# Patient Record
Sex: FEMALE | Race: WHITE | URBAN - METROPOLITAN AREA
[De-identification: names, ages, dates, MRNs, and addresses within clinical notes are randomized per-mention and may not be internally consistent; named-entity substitution may affect disease eponyms.]

---

## 2024-10-22 ENCOUNTER — TELEPHONE (OUTPATIENT)
Dept: OBGYN CLINIC | Age: 29
End: 2024-10-22

## 2024-10-22 NOTE — TELEPHONE ENCOUNTER
Mother called for pt to schedule an appt    Pt was unable to verbally consent to scheduling. The last communication form was filled in 2014 when pt was still a minor. Advise pt to fill out new HIPAA communication form.     The mother stated she would like to schedule her daughter w/ Dr. Calzada per her own research. Looked at Dr. Calzada's availability to give mom and idea of where we are scheduling right now. While looking for NP appt the mother than added that the pt is also pregnant. Advised mom to have daughter call to schedule as scheduling an OB NP is slightly different that than scheduling a NP annual.

## 2024-11-05 ENCOUNTER — OFFICE VISIT (OUTPATIENT)
Dept: OBGYN CLINIC | Age: 29
End: 2024-11-05
Payer: COMMERCIAL

## 2024-11-05 VITALS
WEIGHT: 161 LBS | BODY MASS INDEX: 28.53 KG/M2 | HEIGHT: 63 IN | SYSTOLIC BLOOD PRESSURE: 120 MMHG | DIASTOLIC BLOOD PRESSURE: 72 MMHG

## 2024-11-05 DIAGNOSIS — N92.6 MISSED MENSES: Primary | ICD-10-CM

## 2024-11-05 LAB
CONTROL: ABNORMAL
PREGNANCY TEST URINE, POC: POSITIVE

## 2024-11-05 PROCEDURE — 81025 URINE PREGNANCY TEST: CPT | Performed by: NURSE PRACTITIONER

## 2024-11-05 PROCEDURE — 99203 OFFICE O/P NEW LOW 30 MIN: CPT | Performed by: NURSE PRACTITIONER

## 2024-11-05 RX ORDER — PYRIDOXINE HCL (VITAMIN B6) 50 MG
50 TABLET ORAL 2 TIMES DAILY
Qty: 60 TABLET | Refills: 3 | Status: SHIPPED | OUTPATIENT
Start: 2024-11-05 | End: 2024-12-05

## 2024-11-05 NOTE — PROGRESS NOTES
Carmen Brown  2024    YOB: 1995          The patient was seen today. She is here regarding missed menses. LMP 2024.  Pregnancy not planned but excited. FOB is involved. Denies family hx of congenital heart defects or neural tube defects.  FHT's with doppler 160s.  Her bowels are regular and she is voiding without difficulty.     HPI:  Carmen Brown is a 29 y.o. female  '    Missed menses      OB History    Para Term  AB Living   1 0 0 0 0 0   SAB IAB Ectopic Molar Multiple Live Births   0 0 0 0 0 0      # Outcome Date GA Lbr Brody/2nd Weight Sex Type Anes PTL Lv   1 Current                History reviewed. No pertinent past medical history.    Past Surgical History:   Procedure Laterality Date    WISDOM TOOTH EXTRACTION         Family History   Problem Relation Age of Onset    High Blood Pressure Mother     High Cholesterol Mother     Asthma Sister     Asthma Brother     Heart Disease Maternal Grandmother     COPD Maternal Grandmother     High Blood Pressure Maternal Grandfather     High Cholesterol Maternal Grandfather     Dementia Paternal Grandmother     Other Maternal Great Grandmother         twins       Social History     Socioeconomic History    Marital status: Single     Spouse name: Not on file    Number of children: Not on file    Years of education: Not on file    Highest education level: Not on file   Occupational History    Not on file   Tobacco Use    Smoking status: Never    Smokeless tobacco: Not on file   Vaping Use    Vaping status: Some Days    Substances: Nicotine    Devices: Disposable   Substance and Sexual Activity    Alcohol use: Not on file    Drug use: Not Currently     Types: Marijuana (Weed)    Sexual activity: Not on file   Other Topics Concern    Not on file   Social History Narrative    Not on file     Social Determinants of Health     Financial Resource Strain: Not on file   Food Insecurity: Not on file   Transportation Needs: Not on file

## 2024-11-11 ENCOUNTER — HOSPITAL ENCOUNTER (OUTPATIENT)
Age: 29
Setting detail: SPECIMEN
Discharge: HOME | End: 2024-11-11

## 2024-11-11 ENCOUNTER — INITIAL PRENATAL (OUTPATIENT)
Dept: OBGYN CLINIC | Age: 29
End: 2024-11-11
Payer: COMMERCIAL

## 2024-11-11 VITALS
SYSTOLIC BLOOD PRESSURE: 125 MMHG | HEART RATE: 82 BPM | DIASTOLIC BLOOD PRESSURE: 63 MMHG | BODY MASS INDEX: 28.7 KG/M2 | WEIGHT: 162 LBS

## 2024-11-11 DIAGNOSIS — O09.92 SUPERVISION OF HIGH RISK PREGNANCY IN SECOND TRIMESTER: Primary | ICD-10-CM

## 2024-11-11 DIAGNOSIS — Z3A.15 15 WEEKS GESTATION OF PREGNANCY: Primary | ICD-10-CM

## 2024-11-11 DIAGNOSIS — O09.32 LATE PRENATAL CARE AFFECTING PREGNANCY IN SECOND TRIMESTER: ICD-10-CM

## 2024-11-11 PROCEDURE — 36415 COLL VENOUS BLD VENIPUNCTURE: CPT | Performed by: NURSE PRACTITIONER

## 2024-11-11 NOTE — PROGRESS NOTES
Patient presents to office for OB intake, nurse visit only. Intake completed following ultrasound in office to confirm pregnancy dating/viability. Based on ultrasound, patient is currently 15w2d  gestation, Estimated Date of Delivery: 5/3/25. Patient presents to intake FOB. This was an unplanned pregnancy. Patient currently taking has a current medication list which includes the following prescription(s): prenatal vit-fe fumarate-fa.. Patient's medical, surgical, obstetrical and family history reviewed. See HER for details. Patient prenatal lab work given to patient at this visit as well as OB education material. New OB consent forms signed. Patient declined first trimester screening (patient is too far along).  Cystic Fibrosis screening ordered . Referral placed to [facility] for anatomy scan. Patient scheduled for follow up OB appointment with Tasha. Patient instructed to complete lab work prior to follow up OB appointment.

## 2024-11-11 NOTE — PROGRESS NOTES
Patient was in the office today for a IPV labs.    Draw per physician order using sterile technique.  Drawn from the right ac.

## 2024-11-12 DIAGNOSIS — Z3A.15 15 WEEKS GESTATION OF PREGNANCY: ICD-10-CM

## 2024-11-12 LAB
ABO + RH BLD: NORMAL
BASOPHILS # BLD: 0.03 K/UL (ref 0–0.2)
BASOPHILS NFR BLD: 0 % (ref 0–2)
BILIRUB UR QL STRIP: NEGATIVE
BLOOD GROUP ANTIBODIES SERPL: NEGATIVE
CLARITY UR: CLEAR
COLOR UR: YELLOW
COMMENT: NORMAL
EOSINOPHIL # BLD: 0.16 K/UL (ref 0–0.44)
EOSINOPHILS RELATIVE PERCENT: 2 % (ref 1–4)
ERYTHROCYTE [DISTWIDTH] IN BLOOD BY AUTOMATED COUNT: 13.5 % (ref 11.8–14.4)
GLUCOSE SERPL-MCNC: 85 MG/DL (ref 74–99)
GLUCOSE UR STRIP-MCNC: NEGATIVE MG/DL
HBV SURFACE AG SERPL QL IA: NONREACTIVE
HCT VFR BLD AUTO: 39.2 % (ref 36.3–47.1)
HCV AB SERPL QL IA: NONREACTIVE
HGB BLD-MCNC: 12.4 G/DL (ref 11.9–15.1)
HGB UR QL STRIP.AUTO: NEGATIVE
HIV 1+2 AB+HIV1 P24 AG SERPL QL IA: NONREACTIVE
IMM GRANULOCYTES # BLD AUTO: 0.11 K/UL (ref 0–0.3)
IMM GRANULOCYTES NFR BLD: 1 %
KETONES UR STRIP-MCNC: NEGATIVE MG/DL
LEUKOCYTE ESTERASE UR QL STRIP: NEGATIVE
LYMPHOCYTES NFR BLD: 2.35 K/UL (ref 1.1–3.7)
LYMPHOCYTES RELATIVE PERCENT: 21 % (ref 24–43)
MCH RBC QN AUTO: 29.2 PG (ref 25.2–33.5)
MCHC RBC AUTO-ENTMCNC: 31.6 G/DL (ref 28.4–34.8)
MCV RBC AUTO: 92.2 FL (ref 82.6–102.9)
MONOCYTES NFR BLD: 0.73 K/UL (ref 0.1–1.2)
MONOCYTES NFR BLD: 7 % (ref 3–12)
NEUTROPHILS NFR BLD: 69 % (ref 36–65)
NEUTS SEG NFR BLD: 7.61 K/UL (ref 1.5–8.1)
NITRITE UR QL STRIP: NEGATIVE
NRBC BLD-RTO: 0 PER 100 WBC
PH UR STRIP: 6.5 [PH] (ref 5–8)
PLATELET # BLD AUTO: 263 K/UL (ref 138–453)
PMV BLD AUTO: 12 FL (ref 8.1–13.5)
PROT UR STRIP-MCNC: NEGATIVE MG/DL
RBC # BLD AUTO: 4.25 M/UL (ref 3.95–5.11)
RUBV IGG SERPL QL IA: 320 IU/ML
SP GR UR STRIP: 1 (ref 1–1.03)
T PALLIDUM AB SER QL IA: NONREACTIVE
TSH SERPL DL<=0.05 MIU/L-ACNC: 1.9 UIU/ML (ref 0.27–4.2)
UROBILINOGEN UR STRIP-ACNC: NORMAL EU/DL (ref 0–1)
WBC OTHER # BLD: 11 K/UL (ref 3.5–11.3)

## 2024-11-20 ENCOUNTER — ROUTINE PRENATAL (OUTPATIENT)
Dept: OBGYN CLINIC | Age: 29
End: 2024-11-20

## 2024-11-20 ENCOUNTER — HOSPITAL ENCOUNTER (OUTPATIENT)
Age: 29
Setting detail: SPECIMEN
Discharge: HOME | End: 2024-11-20

## 2024-11-20 VITALS
DIASTOLIC BLOOD PRESSURE: 54 MMHG | SYSTOLIC BLOOD PRESSURE: 104 MMHG | BODY MASS INDEX: 29.05 KG/M2 | WEIGHT: 164 LBS | HEART RATE: 80 BPM

## 2024-11-20 DIAGNOSIS — Z3A.16 16 WEEKS GESTATION OF PREGNANCY: ICD-10-CM

## 2024-11-20 DIAGNOSIS — O09.32 LATE PRENATAL CARE AFFECTING PREGNANCY IN SECOND TRIMESTER: ICD-10-CM

## 2024-11-20 DIAGNOSIS — O09.32 LATE PRENATAL CARE AFFECTING PREGNANCY IN SECOND TRIMESTER: Primary | ICD-10-CM

## 2024-11-20 DIAGNOSIS — Z34.02 PRIMIGRAVIDA IN SECOND TRIMESTER: ICD-10-CM

## 2024-11-20 PROCEDURE — 0502F SUBSEQUENT PRENATAL CARE: CPT | Performed by: NURSE PRACTITIONER

## 2024-11-20 NOTE — PROGRESS NOTES
Patient was in the office today for a Maternal quad, Cystic fibrosis.    Draw per physician order using sterile technique.  Drawn from the Left antecubital.

## 2024-11-20 NOTE — PROGRESS NOTES
Carmen Brown  2024    YOB: 1995    2024   Patient's last menstrual period was 2024 (approximate).  16w4d        Primary Care Physician: No primary care provider on file.        CC: Initial Prenatal Visit    Subjective:     Carmen Brown is a 29 y.o. female     is being seen today for her first obstetrical visit.  This is not a planned pregnancy. She is at 16w4d  Her obstetrical history is significant for LPC, primigravida .      Relationship with FOB: significant other, not living together. Patient does intend to breast feed. Pregnancy history fully reviewed.  Mother's ethnicity:  an    Father's ethnicity:       Objective:   Blood pressure (!) 104/54, pulse 80, weight 74.4 kg (164 lb), last menstrual period 2024.    OB History    Para Term  AB Living   1 0 0 0 0 0   SAB IAB Ectopic Molar Multiple Live Births   0 0 0 0 0 0      # Outcome Date GA Lbr Brody/2nd Weight Sex Type Anes PTL Lv   1 Current                Past Medical History:   Diagnosis Date    Acne 2014    Acute pharyngitis 2013    Otitis media 2013     Past Surgical History:   Procedure Laterality Date    WISDOM TOOTH EXTRACTION        Social History     Socioeconomic History    Marital status: Single     Spouse name: Not on file    Number of children: Not on file    Years of education: Not on file    Highest education level: Not on file   Occupational History    Not on file   Tobacco Use    Smoking status: Never    Smokeless tobacco: Not on file   Vaping Use    Vaping status: Some Days    Substances: Nicotine    Devices: Disposable   Substance and Sexual Activity    Alcohol use: Not on file    Drug use: Not Currently     Types: Marijuana (Weed)    Sexual activity: Not on file   Other Topics Concern    Not on file   Social History Narrative    Not on file     Social Determinants of Health     Financial Resource Strain: Not on file   Food

## 2024-11-21 LAB
C TRACH DNA SPEC QL PROBE+SIG AMP: NEGATIVE
N GONORRHOEA DNA SPEC QL PROBE+SIG AMP: NEGATIVE
SPECIMEN DESCRIPTION: NORMAL

## 2024-11-22 LAB
AFP INTERPRETATION: NORMAL
AFP MOM: 0.54
AFP QUAD INTERPRETATION: NORMAL
AFP SPECIMEN: NORMAL
AFP: 19 NG/ML
DATE OF BIRTH: NORMAL
DATING METHOD: NORMAL
DETERMINED BY: NORMAL
DIABETIC: NORMAL
DIMERIC INHIBIN A: 95 PG/ML
DONOR EGG?: NORMAL
DUE DATE: NORMAL
ESTIMATED DUE DATE: NORMAL
FAMILY HISTORY NTD: NORMAL
GESTATIONAL AGE: NORMAL
HX OF HEREDITARY DISORDERS: NO
IN VITRO FERTILIZATION: NORMAL
INHIBIN A MOM: 0.58
INSULIN REQ DIABETES: NORMAL
LAST MENSTRUAL PERIOD: NORMAL
MATERNAL AGE AT EDD: 27.5 YR
MATERNAL WEIGHT: NORMAL
MOM FOR HCG, 2ND TRIMESTER: 0.54
MONOCHORIONIC TWINS: NORMAL
NUMBER OF FETUSES: NORMAL
PATIENT WEIGHT UNITS: NORMAL
PATIENT WEIGHT: NORMAL
PATIENT'S HCG, TRI 2: NORMAL IU/L
PREV TRISOMY PREG: NORMAL
RACE (MATERNAL): NORMAL
RACE: NORMAL
REPEAT SPECIMEN?: NORMAL
SMOKING: NORMAL
SMOKING: NORMAL
UE3 MOM: 0.76
UE3 VALUE: 1.19 NG/ML
VALPROIC/CARBAMAZEP: NORMAL
ZZ NTE CLEAN UP: HISTORY: NORMAL

## 2024-11-24 LAB
MICROORGANISM SPEC CULT: ABNORMAL
SERVICE CMNT-IMP: ABNORMAL
SPECIMEN DESCRIPTION: ABNORMAL

## 2024-11-25 ENCOUNTER — TELEPHONE (OUTPATIENT)
Dept: OBGYN CLINIC | Age: 29
End: 2024-11-25

## 2024-11-25 NOTE — TELEPHONE ENCOUNTER
[facility] Result Note Patient Contact Communication   [address] [place] [address]      11/25/24   4:06 PM     Patients Name: Carmen Brown   Medical Record Number: [id number]   Contact Serial Number: [id number]  YOB: 1995       Patients Phone Number: [phone]     Description of Recommendations:  The patient was contacted and her identity was confirmed with two of the specific identifiers listed above.  The information regarding her recent testing results and provider recommendations were reviewed with her in detail and all questions were answered.   Recent labs/Cultures/ Imaging Studies/Pathology reports and Urinalysis results are included:      Recommendations given by provider:  ----- Message from ABHISHEK Cleary CNP sent at 11/25/2024  8:14 AM EST -----  + candida - OTC monistat if patient is symptomatic       The patient verbalized understanding of the information above and the recommendation by the provider. She will contact her PCP if any other questions arise or contact our office for any other clarifications.        Electronically signed by Olya Paulino on 11/25/2024 at 4:06 PM

## 2024-11-25 NOTE — TELEPHONE ENCOUNTER
----- Message from ABHISHEK Cleary - CNP sent at 11/25/2024  8:14 AM EST -----  + candida - OTC monistat if patient is symptomatic

## 2024-11-26 LAB
CFTR ALLELE 1 BLD/T QL: NEGATIVE
CFTR ALLELE 2 BLD/T QL: NEGATIVE
CFTR MUT ANL BLD/T: NORMAL
CFTR MUT ANL BLD/T: NORMAL

## 2024-12-01 LAB — CYTOLOGY REPORT: NORMAL

## 2024-12-18 ENCOUNTER — ROUTINE PRENATAL (OUTPATIENT)
Dept: OBGYN CLINIC | Age: 29
End: 2024-12-18

## 2024-12-18 ENCOUNTER — ROUTINE PRENATAL (OUTPATIENT)
Dept: PERINATAL CARE | Age: 29
End: 2024-12-18
Payer: COMMERCIAL

## 2024-12-18 VITALS
WEIGHT: 182 LBS | DIASTOLIC BLOOD PRESSURE: 62 MMHG | RESPIRATION RATE: 16 BRPM | HEIGHT: 63 IN | SYSTOLIC BLOOD PRESSURE: 120 MMHG | TEMPERATURE: 98.8 F | BODY MASS INDEX: 32.25 KG/M2 | HEART RATE: 56 BPM

## 2024-12-18 VITALS
DIASTOLIC BLOOD PRESSURE: 60 MMHG | BODY MASS INDEX: 32.42 KG/M2 | HEART RATE: 90 BPM | WEIGHT: 183 LBS | SYSTOLIC BLOOD PRESSURE: 132 MMHG

## 2024-12-18 DIAGNOSIS — Z3A.20 20 WEEKS GESTATION OF PREGNANCY: ICD-10-CM

## 2024-12-18 DIAGNOSIS — O44.40 LOW IMPLANTATION OF PLACENTA WITHOUT HEMORRHAGE: Primary | ICD-10-CM

## 2024-12-18 DIAGNOSIS — O44.40 LOW-LYING PLACENTA: ICD-10-CM

## 2024-12-18 DIAGNOSIS — O99.212 OBESITY AFFECTING PREGNANCY IN SECOND TRIMESTER, UNSPECIFIED OBESITY TYPE: ICD-10-CM

## 2024-12-18 DIAGNOSIS — Z36.86 ENCOUNTER FOR SCREENING FOR RISK OF PRE-TERM LABOR: ICD-10-CM

## 2024-12-18 DIAGNOSIS — O09.32 LATE PRENATAL CARE AFFECTING PREGNANCY IN SECOND TRIMESTER: Primary | ICD-10-CM

## 2024-12-18 DIAGNOSIS — Z34.02 PRIMIGRAVIDA IN SECOND TRIMESTER: ICD-10-CM

## 2024-12-18 DIAGNOSIS — O09.32 INSUFFICIENT PRENATAL CARE IN SECOND TRIMESTER: ICD-10-CM

## 2024-12-18 PROCEDURE — 76817 TRANSVAGINAL US OBSTETRIC: CPT | Performed by: OBSTETRICS & GYNECOLOGY

## 2024-12-18 PROCEDURE — 76811 OB US DETAILED SNGL FETUS: CPT | Performed by: OBSTETRICS & GYNECOLOGY

## 2024-12-18 PROCEDURE — 99999 PR OFFICE/OUTPT VISIT,PROCEDURE ONLY: CPT | Performed by: OBSTETRICS & GYNECOLOGY

## 2024-12-18 PROCEDURE — 0502F SUBSEQUENT PRENATAL CARE: CPT | Performed by: NURSE PRACTITIONER

## 2024-12-18 NOTE — PROGRESS NOTES
I would advise initiation of daily oral baby aspirin 81 mg based on guidelines by the USPSTF/ACOG (for preeclampsia prevention for pregnant women at \"high-risk\"  for preeclampsia).        Patient has declined the Five Gene Carrier Screen (with the [place] test from [employer]) today.     Please refer to Maternal-Fetal Medicine OBGYN resident progress note in EPIC.

## 2024-12-18 NOTE — PROGRESS NOTES
Obstetric/Gynecology Maternal Fetal Medicine Resident Note    Patient has declined maternal genetic carrier screening.       Jimena Clements DO  OBGYN Resident, PGY2  [facility]  12/18/2024, 10:21 AM

## 2024-12-18 NOTE — PROGRESS NOTES
Carmen Brown is a 29 y.o. female 20w4d        OB History    Para Term  AB Living   1             SAB IAB Ectopic Molar Multiple Live Births                    # Outcome Date GA Lbr Brody/2nd Weight Sex Type Anes PTL Lv   1 Current                Vitals  BP: 132/60  Weight - Scale: 83 kg (183 lb)  Pulse: 90  Patient Position: Sitting  Albumin: Negative  Glucose: Negative    The patient was seen and evaluated. There was positive fetal movements. No contractions or leakage of fluid. Signs and symptoms of  labor as well as labor were reviewed.The Nuchal Translucency testing was reviewed and found to be not completed. A maternal quad screen was reviewed and found to be normal. The patients anatomy ultrasound has been completed and reviewed with patient. TOP ST [place] Reviewed. A 28 week lab panel was ordered. This includes a (HH, 1 hr GTT, U/A C&S). The patient is to complete this in the next two to four weeks.    The following was discussed:  A. Counseling on the incidents of birth defects in the general population being 2-4% and that ultrasound does not diagnose every form of congenital abnormality and has limitations .   B. The only way to evaluate the chromosomal makeup to near 100% certainty is with invasive testing such as chorionic villous sampling or amniocentesis.   C. The options for testing listed in (B) with detail and all risks/benefits and alternatives will be discussed in the high risk setting.   D. NIPT testing options will be discussed with the patient, taking a maternal blood sample and screening it for fetal cells then performing a genetic karyotype.  If this were to be positive for                    a trisomy then a confirmatory amniocentesis or [facility] for confirmation would be needed.    E. TOPSTOH guidelines will be reviewed with the patient.      The S/S of Pre-Eclampsia were reviewed with the patient in detail. She is to report any of these if they occur. She currently

## 2025-01-16 ENCOUNTER — ROUTINE PRENATAL (OUTPATIENT)
Dept: OBGYN CLINIC | Age: 30
End: 2025-01-16

## 2025-01-16 VITALS
SYSTOLIC BLOOD PRESSURE: 118 MMHG | DIASTOLIC BLOOD PRESSURE: 70 MMHG | WEIGHT: 193 LBS | HEART RATE: 86 BPM | BODY MASS INDEX: 34.19 KG/M2

## 2025-01-16 DIAGNOSIS — Z3A.24 24 WEEKS GESTATION OF PREGNANCY: ICD-10-CM

## 2025-01-16 DIAGNOSIS — O44.40 LOW-LYING PLACENTA: Primary | ICD-10-CM

## 2025-01-16 PROCEDURE — 0502F SUBSEQUENT PRENATAL CARE: CPT | Performed by: OBSTETRICS & GYNECOLOGY

## 2025-01-16 NOTE — PROGRESS NOTES
optional Fragile X).  If this were to be positive for carrier status the specimen                   would be analyzed to determine a precise fetal risk.  A Maternal Fetal Medicine referral would then be placed.  The aneuploidy screen will check for (Trisomies 13, 18, and 21), (Sex Chromosome Aneuploidies Monosomy X, XXY, XYY, XXX), (Zygosity in twin pregnancies), (22q11.2                   microdeletion's-optional) and (fetal sex-optional). The patient elected to proceed with this testing and get her blood drawn No   E. TOPSTOH guidelines will be reviewed with the patient.      The S/S of Pre-Eclampsia were reviewed with the patient in detail. She is to report any of these if they occur. She currently denies any of these.    The patient is RH positive Rhogam Ordered no    The patient was instructed on fetal kick counts and was given a kick sheet to complete every 8 hours. This is to begin at 28 weeks gestation. She was instructed that the baby should move at a minimum of ten times within one hour after a meal. The patient was instructed to lay down on her left side twenty minutes after eating and count movements for up to one hour with a target value of ten movements.  She was instructed to notify the office if she did not make that target after two attempts or if after any attempt there was less than four movements.      [facility] to [facility] from [facility]    PILAR by TVUS:     High Risk Dx:      PRENATAL LAB RESULTS:   Pre-pregnancy: BMI  Blood Type/Rh: A+  Antibody Screen: neg  Hemoglobin, Hematocrit: 12.4/39.2  Rubella: immune  T. Pallidum, IgG: neg  Hepatitis B Surface Antigen: neg  Hep c: neg  TSH: 1.90  HIV: neg  Sickle Cell Screen: n/a  Gonorrhea: neg  Chlamydia:neg  Urine culture: neg    Early  hour Glucose Tolerance Test:     28 wk 1 hr GTT    Glucose Fasting: **  1 hour Glucose Tolerance Test: **  2 hour Glucose Tolerance Test: **  3 hour Glucose Tolerance Test: **    Group B Strep:

## 2025-02-12 ENCOUNTER — HOSPITAL ENCOUNTER (OUTPATIENT)
Age: 30
Setting detail: SPECIMEN
Discharge: HOME | End: 2025-02-12

## 2025-02-12 ENCOUNTER — ROUTINE PRENATAL (OUTPATIENT)
Dept: OBGYN CLINIC | Age: 30
End: 2025-02-12
Payer: COMMERCIAL

## 2025-02-12 VITALS
SYSTOLIC BLOOD PRESSURE: 110 MMHG | BODY MASS INDEX: 35.25 KG/M2 | DIASTOLIC BLOOD PRESSURE: 50 MMHG | HEART RATE: 90 BPM | WEIGHT: 199 LBS

## 2025-02-12 DIAGNOSIS — Z23 NEED FOR TDAP VACCINATION: ICD-10-CM

## 2025-02-12 DIAGNOSIS — O44.40 LOW-LYING PLACENTA: ICD-10-CM

## 2025-02-12 DIAGNOSIS — Z3A.28 28 WEEKS GESTATION OF PREGNANCY: ICD-10-CM

## 2025-02-12 DIAGNOSIS — Z34.02 PRIMIGRAVIDA IN SECOND TRIMESTER: ICD-10-CM

## 2025-02-12 DIAGNOSIS — O09.32 LATE PRENATAL CARE AFFECTING PREGNANCY IN SECOND TRIMESTER: Primary | ICD-10-CM

## 2025-02-12 DIAGNOSIS — O09.32 LATE PRENATAL CARE AFFECTING PREGNANCY IN SECOND TRIMESTER: ICD-10-CM

## 2025-02-12 LAB
BACTERIA URNS QL MICRO: ABNORMAL
BASOPHILS # BLD: 0.04 K/UL (ref 0–0.2)
BASOPHILS NFR BLD: 0 % (ref 0–2)
BILIRUB UR QL STRIP: NEGATIVE
CASTS #/AREA URNS LPF: ABNORMAL /LPF (ref 0–8)
CLARITY UR: CLEAR
COLOR UR: YELLOW
EOSINOPHIL # BLD: 0.2 K/UL (ref 0–0.44)
EOSINOPHILS RELATIVE PERCENT: 2 % (ref 1–4)
EPI CELLS #/AREA URNS HPF: ABNORMAL /HPF (ref 0–5)
ERYTHROCYTE [DISTWIDTH] IN BLOOD BY AUTOMATED COUNT: 12.6 % (ref 11.8–14.4)
GLUCOSE 3H P 100 G GLC PO SERPL-MCNC: 114 MG/DL
GLUCOSE UR STRIP-MCNC: NEGATIVE MG/DL
HCT VFR BLD AUTO: 36.3 % (ref 36.3–47.1)
HGB BLD-MCNC: 11.2 G/DL (ref 11.9–15.1)
HGB UR QL STRIP.AUTO: NEGATIVE
IMM GRANULOCYTES # BLD AUTO: 0.21 K/UL (ref 0–0.3)
IMM GRANULOCYTES NFR BLD: 2 %
KETONES UR STRIP-MCNC: NEGATIVE MG/DL
LEUKOCYTE ESTERASE UR QL STRIP: ABNORMAL
LYMPHOCYTES NFR BLD: 2.01 K/UL (ref 1.1–3.7)
LYMPHOCYTES RELATIVE PERCENT: 16 % (ref 24–43)
MCH RBC QN AUTO: 28.1 PG (ref 25.2–33.5)
MCHC RBC AUTO-ENTMCNC: 30.9 G/DL (ref 28.4–34.8)
MCV RBC AUTO: 91.2 FL (ref 82.6–102.9)
MONOCYTES NFR BLD: 0.66 K/UL (ref 0.1–1.2)
MONOCYTES NFR BLD: 5 % (ref 3–12)
NEUTROPHILS NFR BLD: 75 % (ref 36–65)
NEUTS SEG NFR BLD: 9.18 K/UL (ref 1.5–8.1)
NITRITE UR QL STRIP: NEGATIVE
NRBC BLD-RTO: 0 PER 100 WBC
PH UR STRIP: 7.5 [PH] (ref 5–8)
PLATELET # BLD AUTO: 218 K/UL (ref 138–453)
PMV BLD AUTO: 12.3 FL (ref 8.1–13.5)
PROT UR STRIP-MCNC: NEGATIVE MG/DL
RBC # BLD AUTO: 3.98 M/UL (ref 3.95–5.11)
RBC #/AREA URNS HPF: ABNORMAL /HPF (ref 0–4)
SP GR UR STRIP: 1.01 (ref 1–1.03)
UROBILINOGEN UR STRIP-ACNC: NORMAL EU/DL (ref 0–1)
WBC #/AREA URNS HPF: ABNORMAL /HPF (ref 0–5)
WBC OTHER # BLD: 12.3 K/UL (ref 3.5–11.3)

## 2025-02-12 PROCEDURE — 90715 TDAP VACCINE 7 YRS/> IM: CPT | Performed by: NURSE PRACTITIONER

## 2025-02-12 PROCEDURE — 90471 IMMUNIZATION ADMIN: CPT | Performed by: NURSE PRACTITIONER

## 2025-02-12 PROCEDURE — 0502F SUBSEQUENT PRENATAL CARE: CPT | Performed by: NURSE PRACTITIONER

## 2025-02-12 PROCEDURE — 36415 COLL VENOUS BLD VENIPUNCTURE: CPT | Performed by: NURSE PRACTITIONER

## 2025-02-12 NOTE — PROGRESS NOTES
Patient was in the office today for a 1 hour gtt and cbc.    Draw per physician order using sterile technique.  Drawn from the right AC.

## 2025-02-12 NOTE — PROGRESS NOTES
.samir Morales ELIZABETH Brown is a 29 y.o. female 28w4d        OB History    Para Term  AB Living   1             SAB IAB Ectopic Molar Multiple Live Births                    # Outcome Date GA Lbr Brody/2nd Weight Sex Type Anes PTL Lv   1 Current                Vitals  BP: (!) 110/50  Weight - Scale: 90.3 kg (199 lb)  Pulse: 90  Patient Position: Sitting  Albumin: Negative  Glucose: Negative      The patient was seen and evaluated. There was positive fetal movements. No contractions or leakage of fluid. Signs and symptoms of  labor as well as labor were reviewed. The S/S of Pre-Eclampsia were reviewed with the patient in detail. She is to report any of these if they occur. She currently denies any of these.    The patient had her 28 week labs ordered.  No visits with results within 5 Week(s) from this visit.   Latest known visit with results is:   Hospital Outpatient Visit on 2024   Component Date Value Ref Range Status    AFP (Alpha Fetoprotein) 2024 19  ng/mL Final    AFP Mom 2024 0.54   Final    uE3 Value 2024 1.19  ng/mL Final    uE3 MoM 2024 0.76   Final    Patient's HCG, Tri 2 2024 18,928  IU/L Final    Mom For HCG, 2nd Trimester 2024 0.54   Final    Dimeric Inhibin A 2024 95  pg/mL Final    Inhibin A MoM 2024 0.58   Final    AFP Interp 2024 Screen Neg   Final    Comment: (NOTE)  INTERPRETATION: SCREEN NEGATIVE                               Neural Tube Defects (NTD)   Negative       Down syndrome (DS)          Negative       Trisomy 18 (T18)            Negative                                                               Pre-Test     Post-Test    Cutoff                                      Neural Tube Defects Risks   1:1030       < 1:69601    1:250          Down Syndrome Risks         1:939        1:6450       1:150          Trisomy 18 Risks            1:3660       1:4490       1:100

## 2025-02-13 LAB
MICROORGANISM SPEC CULT: NORMAL
SPECIMEN DESCRIPTION: NORMAL

## 2025-02-25 ENCOUNTER — ROUTINE PRENATAL (OUTPATIENT)
Dept: OBGYN CLINIC | Age: 30
End: 2025-02-25

## 2025-02-25 VITALS
BODY MASS INDEX: 36.85 KG/M2 | DIASTOLIC BLOOD PRESSURE: 80 MMHG | HEART RATE: 99 BPM | WEIGHT: 208 LBS | SYSTOLIC BLOOD PRESSURE: 124 MMHG

## 2025-02-25 DIAGNOSIS — Z34.00 PRIMIGRAVIDA, ANTEPARTUM: ICD-10-CM

## 2025-02-25 DIAGNOSIS — O09.30 LATE PRENATAL CARE AFFECTING PREGNANCY, ANTEPARTUM: ICD-10-CM

## 2025-02-25 DIAGNOSIS — Z3A.30 30 WEEKS GESTATION OF PREGNANCY: ICD-10-CM

## 2025-02-25 DIAGNOSIS — O09.93 HIGH-RISK PREGNANCY IN THIRD TRIMESTER: Primary | ICD-10-CM

## 2025-02-25 DIAGNOSIS — O44.40 LOW-LYING PLACENTA: ICD-10-CM

## 2025-02-25 PROCEDURE — 0502F SUBSEQUENT PRENATAL CARE: CPT | Performed by: OBSTETRICS & GYNECOLOGY

## 2025-02-25 NOTE — PROGRESS NOTES
**  1 hour Glucose Tolerance Test: **  2 hour Glucose Tolerance Test: **  3 hour Glucose Tolerance Test: **    Group B Strep:      Cystic Fibrosis Screen:  neg  First Trimester Screen:  late pnc  Quad Screen:  neg  Anatomy US: 24    Tdap: yes; Date given: 2025  Flu shot:   COVID Shot:      T-Dap Vaccine (27-36 weeks): completed    RSV Vaccine (32-36 weeks): awaiting    The patient was instructed on fetal kick counts and was given a kick sheet to complete every 8 hours. She was instructed that the baby should move at a minimum of ten times within one hour after a meal. The patient was instructed to lay down on her left side twenty minutes after eating and count movements for up to one hour with a target value of ten movements.  She was instructed to notify the office if she did not make that target after two attempts or if after any attempt there was less than four movements.    The patient reports that the targets have been made Yes.     Testing: ( testing time excludes any time spent for the prenatal visit)  Not indicated  The recommendation to proceed to fetal kick counts every 8 hours daily instead of Non stress testing, (as per Nirmala RC, Joyce G, Stefano F, Virginia V, [facility] guidance for Covid-19 testing, American Journal of Obstetrics & Gynecology, 2020)     Assessment:  1Terry Brown is a 29 y.o. female  2.   3. 30w3d    Patient Active Problem List    Diagnosis Date Noted    Low-lying placenta 2024     Priority: High     2024 pelvic rest and no heavy lifting      Primigravida in second trimester 2024        Diagnosis Orders   1. High-risk pregnancy in third trimester        2. Low-lying placenta        3. Primigravida, antepartum        4. Late prenatal care affecting pregnancy, antepartum        5. 30 weeks gestation of pregnancy                  Plan:  The patient will return to the office for her next visit in 2 weeks. See antepartum flow sheet.

## 2025-02-26 ENCOUNTER — ROUTINE PRENATAL (OUTPATIENT)
Dept: PERINATAL CARE | Age: 30
End: 2025-02-26
Payer: COMMERCIAL

## 2025-02-26 VITALS
BODY MASS INDEX: 36.86 KG/M2 | HEIGHT: 63 IN | DIASTOLIC BLOOD PRESSURE: 60 MMHG | RESPIRATION RATE: 16 BRPM | HEART RATE: 87 BPM | SYSTOLIC BLOOD PRESSURE: 119 MMHG | TEMPERATURE: 98.1 F | WEIGHT: 208 LBS | OXYGEN SATURATION: 99 %

## 2025-02-26 DIAGNOSIS — Z34.03 PRIMIGRAVIDA, THIRD TRIMESTER: ICD-10-CM

## 2025-02-26 DIAGNOSIS — Z3A.30 30 WEEKS GESTATION OF PREGNANCY: Primary | ICD-10-CM

## 2025-02-26 PROCEDURE — 76817 TRANSVAGINAL US OBSTETRIC: CPT | Performed by: OBSTETRICS & GYNECOLOGY

## 2025-02-26 PROCEDURE — 76819 FETAL BIOPHYS PROFIL W/O NST: CPT | Performed by: OBSTETRICS & GYNECOLOGY

## 2025-02-26 PROCEDURE — 99999 PR OFFICE/OUTPT VISIT,PROCEDURE ONLY: CPT | Performed by: OBSTETRICS & GYNECOLOGY

## 2025-02-26 PROCEDURE — 76805 OB US >/= 14 WKS SNGL FETUS: CPT | Performed by: OBSTETRICS & GYNECOLOGY

## 2025-02-26 NOTE — PROGRESS NOTES
[facility]  [address]  [place] [address]  Dept: [phone]     2025    RE:  Carmen Brown     : 1995   AGE: 29 y.o.     Dear Dr. Dixon,    Thank you for allowing me to see Carmen Brown.  As I'm sure you will recall, Carmen Brown is a 29 y.o. [id number]'s last menstrual period was 2024 (approximate). Estimated Date of Delivery: 5/3/25 at 30w4d seen in our office today for the following:    REASON FOR VISIT: Growth     Patient Active Problem List    Diagnosis Date Noted    Low-lying placenta 2024    Primigravida in second trimester 2024        PAST HISTORY:  OB History    Para Term  AB Living   1             SAB IAB Ectopic Molar Multiple Live Births                    # Outcome Date GA Lbr Brody/2nd Weight Sex Type Anes PTL Lv   1 Current                   MEDICAL:  Past Medical History:   Diagnosis Date    Acne 2014    Acute pharyngitis 2013    Otitis media 2013        SURGICAL:  Past Surgical History:   Procedure Laterality Date    WISDOM TOOTH EXTRACTION         ALLERGIES:    Allergies   Allergen Reactions    Dog Epithelium Other (See Comments)     Animal dander         MEDICATIONS:    Current Outpatient Medications   Medication Sig Dispense Refill    Prenatal Vit-Fe Fumarate-FA (PRENATAL VITAMIN PO) Take by mouth       No current facility-administered medications for this visit.        Social History     Socioeconomic History    Marital status: Single     Spouse name: None    Number of children: None    Years of education: None    Highest education level: None   Tobacco Use    Smoking status: Never    Smokeless tobacco: Never   Vaping Use    Vaping status: Former    Quit date: 2024    Substances: Nicotine    Devices: Disposable   Substance and Sexual Activity    Alcohol use: Not Currently    Drug use: Not Currently     Types: Marijuana

## 2025-03-11 ENCOUNTER — ROUTINE PRENATAL (OUTPATIENT)
Dept: OBGYN CLINIC | Age: 30
End: 2025-03-11

## 2025-03-11 VITALS
HEART RATE: 86 BPM | BODY MASS INDEX: 36.85 KG/M2 | DIASTOLIC BLOOD PRESSURE: 70 MMHG | WEIGHT: 208 LBS | SYSTOLIC BLOOD PRESSURE: 114 MMHG

## 2025-03-11 DIAGNOSIS — Z3A.32 32 WEEKS GESTATION OF PREGNANCY: Primary | ICD-10-CM

## 2025-03-11 PROCEDURE — 0502F SUBSEQUENT PRENATAL CARE: CPT | Performed by: NURSE PRACTITIONER

## 2025-03-11 NOTE — PROGRESS NOTES
.samir Morales ELIZABETH Brown is a 29 y.o. female 32w3d        OB History    Para Term  AB Living   1        SAB IAB Ectopic Molar Multiple Live Births              # Outcome Date GA Lbr Brody/2nd Weight Sex Type Anes PTL Lv   1 Current                Vitals  BP: 114/70  Weight - Scale: 94.3 kg (208 lb)  Pulse: 86  Patient Position: Sitting  Albumin: Negative  Glucose: Negative      The patient was seen and evaluated. There was positive fetal movements. No contractions or leakage of fluid. Signs and symptoms of  labor as well as labor were reviewed. The S/S of Pre-Eclampsia were reviewed with the patient in detail. She is to report any of these if they occur. She currently denies any of these.    The patient had her 28 week labs completed.  Hospital Outpatient Visit on 2025   Component Date Value Ref Range Status    Glucose, GTT - 1 Hour 2025 114  mg/dL Final    Comment: Refer to the ACOG standards for reference ranges. Doe and Coustan Conversion is the   preferred diagnostic criteria.      WBC 2025 12.3 (H)  3.5 - 11.3 k/uL Final    RBC 2025 3.98  3.95 - 5.11 m/uL Final    Hemoglobin 2025 11.2 (L)  11.9 - 15.1 g/dL Final    Hematocrit 2025 36.3  36.3 - 47.1 % Final    MCV 2025 91.2  82.6 - 102.9 fL Final    MCH 2025 28.1  25.2 - 33.5 pg Final    MCHC 2025 30.9  28.4 - 34.8 g/dL Final    RDW 2025 12.6  11.8 - 14.4 % Final    Platelets 2025 218  138 - 453 k/uL Final    MPV 2025 12.3  8.1 - 13.5 fL Final    NRBC Automated 2025 0.0  0.0 per 100 WBC Final    Neutrophils % 2025 75 (H)  36 - 65 % Final    Lymphocytes % 2025 16 (L)  24 - 43 % Final    Monocytes % 2025 5  3 - 12 % Final    Eosinophils % 2025 2  1 - 4 % Final    Basophils % 2025 0  0 - 2 % Final    Immature Granulocytes % 2025 2 (H)  0 % Final    Neutrophils Absolute 2025 9.18 (H)  1.50 - 8.10 k/uL Final

## 2025-03-24 ENCOUNTER — ROUTINE PRENATAL (OUTPATIENT)
Dept: OBGYN CLINIC | Age: 30
End: 2025-03-24

## 2025-03-24 VITALS
SYSTOLIC BLOOD PRESSURE: 112 MMHG | DIASTOLIC BLOOD PRESSURE: 68 MMHG | HEART RATE: 80 BPM | BODY MASS INDEX: 37.55 KG/M2 | WEIGHT: 212 LBS

## 2025-03-24 DIAGNOSIS — O09.93 HIGH-RISK PREGNANCY IN THIRD TRIMESTER: Primary | ICD-10-CM

## 2025-03-24 DIAGNOSIS — O09.30 LATE PRENATAL CARE AFFECTING PREGNANCY, ANTEPARTUM: ICD-10-CM

## 2025-03-24 DIAGNOSIS — Z3A.34 34 WEEKS GESTATION OF PREGNANCY: ICD-10-CM

## 2025-03-24 DIAGNOSIS — O44.40 LOW-LYING PLACENTA: ICD-10-CM

## 2025-03-24 PROCEDURE — 0502F SUBSEQUENT PRENATAL CARE: CPT | Performed by: OBSTETRICS & GYNECOLOGY

## 2025-03-24 NOTE — PROGRESS NOTES
.samir        Carmen Brown is a 29 y.o. female 34w2d        OB History    Para Term  AB Living   1        SAB IAB Ectopic Molar Multiple Live Births              # Outcome Date GA Lbr Brody/2nd Weight Sex Type Anes PTL Lv   1 Current                Vitals  BP: 112/68  Weight - Scale: 96.2 kg (212 lb)  Pulse: 80  Patient Position: Sitting  Albumin: Negative  Glucose: Negative      The patient was seen and evaluated. There was positive fetal movements. No contractions or leakage of fluid. Signs and symptoms of  labor as well as labor were reviewed. The S/S of Pre-Eclampsia were reviewed with the patient in detail. She is to report any of these if they occur. She currently denies any of these.    The patient had her 28 week labs completed.  No visits with results within 5 Week(s) from this visit.   Latest known visit with results is:   Hospital Outpatient Visit on 2025   Component Date Value Ref Range Status    Glucose, GTT - 1 Hour 2025 114  mg/dL Final    Comment: Refer to the ACOG standards for reference ranges. Doe and Coustan Conversion is the   preferred diagnostic criteria.      WBC 2025 12.3 (H)  3.5 - 11.3 k/uL Final    RBC 2025 3.98  3.95 - 5.11 m/uL Final    Hemoglobin 2025 11.2 (L)  11.9 - 15.1 g/dL Final    Hematocrit 2025 36.3  36.3 - 47.1 % Final    MCV 2025 91.2  82.6 - 102.9 fL Final    MCH 2025 28.1  25.2 - 33.5 pg Final    MCHC 2025 30.9  28.4 - 34.8 g/dL Final    RDW 2025 12.6  11.8 - 14.4 % Final    Platelets 2025 218  138 - 453 k/uL Final    MPV 2025 12.3  8.1 - 13.5 fL Final    NRBC Automated 2025 0.0  0.0 per 100 WBC Final    Neutrophils % 2025 75 (H)  36 - 65 % Final    Lymphocytes % 2025 16 (L)  24 - 43 % Final    Monocytes % 2025 5  3 - 12 % Final    Eosinophils % 2025 2  1 - 4 % Final    Basophils % 2025 0  0 - 2 % Final    Immature Granulocytes %

## 2025-04-07 ENCOUNTER — ROUTINE PRENATAL (OUTPATIENT)
Dept: OBGYN CLINIC | Age: 30
End: 2025-04-07

## 2025-04-07 ENCOUNTER — HOSPITAL ENCOUNTER (OUTPATIENT)
Age: 30
Setting detail: SPECIMEN
Discharge: HOME | End: 2025-04-07

## 2025-04-07 VITALS
BODY MASS INDEX: 38.44 KG/M2 | WEIGHT: 217 LBS | SYSTOLIC BLOOD PRESSURE: 118 MMHG | DIASTOLIC BLOOD PRESSURE: 70 MMHG | HEART RATE: 86 BPM

## 2025-04-07 DIAGNOSIS — O44.40 LOW-LYING PLACENTA: Primary | ICD-10-CM

## 2025-04-07 DIAGNOSIS — Z3A.36 36 WEEKS GESTATION OF PREGNANCY: ICD-10-CM

## 2025-04-07 DIAGNOSIS — Z34.02 PRIMIGRAVIDA IN SECOND TRIMESTER: ICD-10-CM

## 2025-04-07 PROCEDURE — 0502F SUBSEQUENT PRENATAL CARE: CPT | Performed by: NURSE PRACTITIONER

## 2025-04-07 NOTE — PROGRESS NOTES
Carmen Brown is a 29 y.o. female 36w2d        OB History    Para Term  AB Living   1        SAB IAB Ectopic Molar Multiple Live Births              # Outcome Date GA Lbr Brody/2nd Weight Sex Type Anes PTL Lv   1 Current                  Vitals  BP: 118/70  Weight - Scale: 98.4 kg (217 lb)  Pulse: 86  Patient Position: Sitting  Albumin: Negative  Glucose: Negative      The patient was seen and evaluated. There was positive fetal movements. No contractions or leakage of fluid. Signs and symptoms of labor were reviewed.  The S/S of Pre-Eclampsia were reviewed with the patient in detail. She is to report any of these if they occur. She currently denies any of these.    The patient was instructed on fetal kick counts and was given a kick sheet to complete every 8 hours. She was instructed that the baby should move at a minimum of ten times within one hour after a meal. The patient was instructed to lay down on her left side twenty minutes after eating and count movements for up to one hour with a target value of ten movements.  She was instructed to notify the office if she did not make that target after two attempts or if after any attempt there was less than four movements.    The patient reports that the targets have been made Yes.    Transfer [facility] to [facility] from [facility]    PILAR by TVUS: 5/3/25    High Risk Dx:  late PNC; low lying placenta    PRENATAL LAB RESULTS:   Pre-pregnancy: BMI  Blood Type/Rh: A+  Antibody Screen: neg  Hemoglobin, Hematocrit: 12.4/39.2  Rubella: immune  T. Pallidum, IgG: neg  Hepatitis B Surface Antigen: neg  Hep c: neg  TSH: 1.90  HIV: neg  Sickle Cell Screen: n/a  Gonorrhea: neg  Chlamydia:neg  Urine culture: neg    Early  hour Glucose Tolerance Test:     28 wk 1 hr GTT:  114    Glucose Fasting: **  1 hour Glucose Tolerance Test: **  2 hour Glucose Tolerance Test: **  3 hour Glucose Tolerance Test: **    Group B Strep:      Cystic Fibrosis Screen:

## 2025-04-08 ENCOUNTER — RESULTS FOLLOW-UP (OUTPATIENT)
Dept: OBGYN CLINIC | Age: 30
End: 2025-04-08

## 2025-04-10 LAB
MICROORGANISM SPEC CULT: NORMAL
SERVICE CMNT-IMP: NORMAL
SPECIMEN DESCRIPTION: NORMAL

## 2025-04-14 ENCOUNTER — ROUTINE PRENATAL (OUTPATIENT)
Dept: OBGYN CLINIC | Age: 30
End: 2025-04-14

## 2025-04-14 VITALS
BODY MASS INDEX: 38.26 KG/M2 | DIASTOLIC BLOOD PRESSURE: 78 MMHG | HEART RATE: 77 BPM | SYSTOLIC BLOOD PRESSURE: 122 MMHG | WEIGHT: 216 LBS

## 2025-04-14 DIAGNOSIS — O44.40 LOW-LYING PLACENTA: ICD-10-CM

## 2025-04-14 DIAGNOSIS — O09.30 LATE PRENATAL CARE AFFECTING PREGNANCY, ANTEPARTUM: ICD-10-CM

## 2025-04-14 DIAGNOSIS — O09.93 HIGH-RISK PREGNANCY IN THIRD TRIMESTER: Primary | ICD-10-CM

## 2025-04-14 DIAGNOSIS — Z3A.37 37 WEEKS GESTATION OF PREGNANCY: ICD-10-CM

## 2025-04-14 PROCEDURE — 0502F SUBSEQUENT PRENATAL CARE: CPT | Performed by: OBSTETRICS & GYNECOLOGY

## 2025-04-14 NOTE — PROGRESS NOTES
Carmen Brown is a 29 y.o. female 37w2d        OB History    Para Term  AB Living   1        SAB IAB Ectopic Molar Multiple Live Births              # Outcome Date GA Lbr Brody/2nd Weight Sex Type Anes PTL Lv   1 Current                Vitals  BP: 122/78  Weight - Scale: 98 kg (216 lb)  Pulse: 77  Patient Position: Sitting  Albumin: Negative  Glucose: Negative      The patient was seen and evaluated. There was positive fetal movements. No contractions or leakage of fluid. Signs and symptoms of labor were reviewed.  The S/S of Pre-Eclampsia were reviewed with the patient in detail. She is to report any of these if they occur. She currently denies any of these.    The patient was instructed on fetal kick counts and was given a kick sheet to complete every 8 hours. She was instructed that the baby should move at a minimum of ten times within one hour after a meal. The patient was instructed to lay down on her left side twenty minutes after eating and count movements for up to one hour with a target value of ten movements.  She was instructed to notify the office if she did not make that target after two attempts or if after any attempt there was less than four movements.    The patient reports that the targets have been made Yes.    Transfer [facility] to [facility] from [facility]    PILAR by TVUS: 5/3/25    High Risk Dx:  late PNC; low lying placenta    PRENATAL LAB RESULTS:   Pre-pregnancy: BMI  Blood Type/Rh: A+  Antibody Screen: neg  Hemoglobin, Hematocrit: 12.4/39.2  Rubella: immune  T. Pallidum, IgG: neg  Hepatitis B Surface Antigen: neg  Hep c: neg  TSH: 1.90  HIV: neg  Sickle Cell Screen: n/a  Gonorrhea: neg  Chlamydia:neg  Urine culture: neg    Early  hour Glucose Tolerance Test:     28 wk 1 hr GTT:  114    Glucose Fasting: **  1 hour Glucose Tolerance Test: **  2 hour Glucose Tolerance Test: **  3 hour Glucose Tolerance Test: **    Group B Strep:  NEG    Cystic Fibrosis Screen:

## 2025-04-24 ENCOUNTER — ROUTINE PRENATAL (OUTPATIENT)
Dept: OBGYN CLINIC | Age: 30
End: 2025-04-24

## 2025-04-24 VITALS
SYSTOLIC BLOOD PRESSURE: 120 MMHG | DIASTOLIC BLOOD PRESSURE: 70 MMHG | HEART RATE: 76 BPM | WEIGHT: 226 LBS | BODY MASS INDEX: 40.03 KG/M2

## 2025-04-24 DIAGNOSIS — Z34.00 PRIMIGRAVIDA, ANTEPARTUM: Primary | ICD-10-CM

## 2025-04-24 DIAGNOSIS — O09.30 LATE PRENATAL CARE AFFECTING PREGNANCY, ANTEPARTUM: ICD-10-CM

## 2025-04-24 DIAGNOSIS — O44.40 LOW-LYING PLACENTA: ICD-10-CM

## 2025-04-24 DIAGNOSIS — Z3A.38 38 WEEKS GESTATION OF PREGNANCY: ICD-10-CM

## 2025-04-24 PROCEDURE — 0502F SUBSEQUENT PRENATAL CARE: CPT | Performed by: OBSTETRICS & GYNECOLOGY

## 2025-04-24 NOTE — PROGRESS NOTES
neg  First Trimester Screen:  late pnc  Quad Screen:  neg  Anatomy US: 24    Tdap: yes; Date given: 2025  Flu shot:   COVID Shot:      T-Dap Vaccine Completed (27-36 weeks): Completed     RSV Vaccine (32-36 weeks): awaiting    Allergies:  Allergies as of 2025 - Fully Reviewed 2025   Allergen Reaction Noted    Dog epithelium Other (See Comments) 2013         Group Beta Strep collection was completed. Yes  GBS Results:   Hospital Outpatient Visit on 2025   Component Date Value Ref Range Status    Specimen Description 2025 .VAGINA   Final    Special Requests 2025 Site: Genital   Final    Culture 2025 NEGATIVE FOR GROUP B STREPTOCOCCI   Final   ]        Cervical Exam was:   /-/V/I cm dilated    The literature regarding a questionable link to pitocin augmentation and induction of labor, the assistance of labor contractions and the initiation of contractions to help delivery, have been reviewed with the patient regarding the increased potential of having a  with Attention Deficit Hyperactivity Disorder and or Autism. These two disorders and the ramifications of their impact on a child and the family caring for that child has been reviewed with the patient in detail. She was given the risks, benefits and alternatives of the use of this medication. She has agreed to its use in the delivery of her unborn child if needed at the time of delivery, Yes.      The patient was counseled on the mandatory call ahead policy. She has been instructed to call the office at anytime prior to going into the hospital so the on-call physician may direct her to the appropriate facility for care. Exceptions were reviewed including but not limited to: Decreased fetal movement, vaginal Bleeding or hemorrhage, trauma, readily expectant delivery, or any instance where she feels 911 should be utilized.      The patient was counseled on Labor & Delivery.   Route of delivery and  no

## 2025-04-26 ENCOUNTER — ANESTHESIA (OUTPATIENT)
Dept: LABOR AND DELIVERY | Age: 30
End: 2025-04-26
Payer: COMMERCIAL

## 2025-04-26 ENCOUNTER — HOSPITAL ENCOUNTER (INPATIENT)
Age: 30
LOS: 2 days | Discharge: HOME | End: 2025-04-28
Attending: OBSTETRICS & GYNECOLOGY | Admitting: OBSTETRICS & GYNECOLOGY
Payer: COMMERCIAL

## 2025-04-26 ENCOUNTER — ANESTHESIA EVENT (OUTPATIENT)
Dept: LABOR AND DELIVERY | Age: 30
End: 2025-04-26
Payer: COMMERCIAL

## 2025-04-26 DIAGNOSIS — D64.9 ANEMIA, UNSPECIFIED TYPE: Primary | ICD-10-CM

## 2025-04-26 PROBLEM — Z3A.39 39 WEEKS GESTATION OF PREGNANCY: Status: ACTIVE | Noted: 2025-04-26

## 2025-04-26 LAB
ABO + RH BLD: NORMAL
AMPHET UR QL SCN: NEGATIVE
ARM BAND NUMBER: NORMAL
BARBITURATES UR QL SCN: NEGATIVE
BASOPHILS # BLD: 0.05 K/UL (ref 0–0.2)
BASOPHILS NFR BLD: 0 % (ref 0–2)
BENZODIAZ UR QL: NEGATIVE
BLOOD BANK SAMPLE EXPIRATION: NORMAL
BLOOD GROUP ANTIBODIES SERPL: NEGATIVE
CANNABINOIDS UR QL SCN: NEGATIVE
COCAINE UR QL SCN: NEGATIVE
EOSINOPHIL # BLD: 0.18 K/UL (ref 0–0.44)
EOSINOPHILS RELATIVE PERCENT: 1 % (ref 1–4)
ERYTHROCYTE [DISTWIDTH] IN BLOOD BY AUTOMATED COUNT: 13.9 % (ref 11.8–14.4)
FENTANYL UR QL: NEGATIVE
HCT VFR BLD AUTO: 35.2 % (ref 36.3–47.1)
HGB BLD-MCNC: 10.9 G/DL (ref 11.9–15.1)
IMM GRANULOCYTES # BLD AUTO: 0.26 K/UL (ref 0–0.3)
IMM GRANULOCYTES NFR BLD: 2 %
LYMPHOCYTES NFR BLD: 3.02 K/UL (ref 1.1–3.7)
LYMPHOCYTES RELATIVE PERCENT: 24 % (ref 24–43)
MCH RBC QN AUTO: 25.3 PG (ref 25.2–33.5)
MCHC RBC AUTO-ENTMCNC: 31 G/DL (ref 28.4–34.8)
MCV RBC AUTO: 81.7 FL (ref 82.6–102.9)
METHADONE UR QL: NEGATIVE
MONOCYTES NFR BLD: 0.92 K/UL (ref 0.1–1.2)
MONOCYTES NFR BLD: 7 % (ref 3–12)
NEUTROPHILS NFR BLD: 66 % (ref 36–65)
NEUTS SEG NFR BLD: 8.32 K/UL (ref 1.5–8.1)
NRBC BLD-RTO: 0 PER 100 WBC
OPIATES UR QL SCN: NEGATIVE
OXYCODONE UR QL SCN: NEGATIVE
PCP UR QL SCN: NEGATIVE
PLATELET # BLD AUTO: 234 K/UL (ref 138–453)
PMV BLD AUTO: 12.3 FL (ref 8.1–13.5)
RBC # BLD AUTO: 4.31 M/UL (ref 3.95–5.11)
RBC # BLD: ABNORMAL 10*6/UL
T PALLIDUM AB SER QL IA: NONREACTIVE
TEST INFORMATION: NORMAL
WBC OTHER # BLD: 12.8 K/UL (ref 3.5–11.3)

## 2025-04-26 PROCEDURE — 6370000000 HC RX 637 (ALT 250 FOR IP)

## 2025-04-26 PROCEDURE — 84156 ASSAY OF PROTEIN URINE: CPT

## 2025-04-26 PROCEDURE — 6360000002 HC RX W HCPCS

## 2025-04-26 PROCEDURE — 3700000025 EPIDURAL BLOCK: Performed by: ANESTHESIOLOGY

## 2025-04-26 PROCEDURE — 1220000000 HC SEMI PRIVATE OB R&B

## 2025-04-26 PROCEDURE — 0KQM0ZZ REPAIR PERINEUM MUSCLE, OPEN APPROACH: ICD-10-PCS | Performed by: OBSTETRICS & GYNECOLOGY

## 2025-04-26 PROCEDURE — 86780 TREPONEMA PALLIDUM: CPT

## 2025-04-26 PROCEDURE — 80307 DRUG TEST PRSMV CHEM ANLYZR: CPT

## 2025-04-26 PROCEDURE — 82570 ASSAY OF URINE CREATININE: CPT

## 2025-04-26 PROCEDURE — 7200000001 HC VAGINAL DELIVERY

## 2025-04-26 PROCEDURE — 51701 INSERT BLADDER CATHETER: CPT

## 2025-04-26 PROCEDURE — 86901 BLOOD TYPING SEROLOGIC RH(D): CPT

## 2025-04-26 PROCEDURE — 86850 RBC ANTIBODY SCREEN: CPT

## 2025-04-26 PROCEDURE — 85025 COMPLETE CBC W/AUTO DIFF WBC: CPT

## 2025-04-26 PROCEDURE — 2580000003 HC RX 258

## 2025-04-26 PROCEDURE — 86900 BLOOD TYPING SEROLOGIC ABO: CPT

## 2025-04-26 PROCEDURE — 59400 OBSTETRICAL CARE: CPT | Performed by: OBSTETRICS & GYNECOLOGY

## 2025-04-26 PROCEDURE — 6360000002 HC RX W HCPCS: Performed by: ANESTHESIOLOGY

## 2025-04-26 PROCEDURE — 88307 TISSUE EXAM BY PATHOLOGIST: CPT

## 2025-04-26 RX ORDER — ONDANSETRON 4 MG/1
4 TABLET, ORALLY DISINTEGRATING ORAL EVERY 6 HOURS PRN
Status: DISCONTINUED | OUTPATIENT
Start: 2025-04-26 | End: 2025-04-26

## 2025-04-26 RX ORDER — SODIUM CHLORIDE, SODIUM LACTATE, POTASSIUM CHLORIDE, AND CALCIUM CHLORIDE .6; .31; .03; .02 G/100ML; G/100ML; G/100ML; G/100ML
500 INJECTION, SOLUTION INTRAVENOUS PRN
Status: DISCONTINUED | OUTPATIENT
Start: 2025-04-26 | End: 2025-04-26

## 2025-04-26 RX ORDER — ACETAMINOPHEN 500 MG
1000 TABLET ORAL EVERY 6 HOURS SCHEDULED
Status: DISCONTINUED | OUTPATIENT
Start: 2025-04-26 | End: 2025-04-28 | Stop reason: HOSPADM

## 2025-04-26 RX ORDER — ROPIVACAINE HYDROCHLORIDE 2 MG/ML
INJECTION, SOLUTION EPIDURAL; INFILTRATION; PERINEURAL
Status: COMPLETED
Start: 2025-04-26 | End: 2025-04-26

## 2025-04-26 RX ORDER — EPHEDRINE SULFATE/0.9% NACL/PF 25 MG/5 ML
10 SYRINGE (ML) INTRAVENOUS EVERY 10 MIN PRN
Status: DISCONTINUED | OUTPATIENT
Start: 2025-04-26 | End: 2025-04-26

## 2025-04-26 RX ORDER — AMOXICILLIN 250 MG
1 CAPSULE ORAL DAILY
Qty: 30 TABLET | Refills: 0 | Status: SHIPPED | OUTPATIENT
Start: 2025-04-26

## 2025-04-26 RX ORDER — ACETAMINOPHEN 500 MG
1000 TABLET ORAL 4 TIMES DAILY PRN
Qty: 30 TABLET | Refills: 1 | Status: SHIPPED | OUTPATIENT
Start: 2025-04-26

## 2025-04-26 RX ORDER — SENNA AND DOCUSATE SODIUM 50; 8.6 MG/1; MG/1
1 TABLET, FILM COATED ORAL 2 TIMES DAILY
Status: DISCONTINUED | OUTPATIENT
Start: 2025-04-26 | End: 2025-04-28 | Stop reason: HOSPADM

## 2025-04-26 RX ORDER — DIPHENHYDRAMINE HCL 25 MG
25 TABLET ORAL EVERY 4 HOURS PRN
Status: DISCONTINUED | OUTPATIENT
Start: 2025-04-26 | End: 2025-04-26

## 2025-04-26 RX ORDER — BISACODYL 10 MG
10 SUPPOSITORY, RECTAL RECTAL DAILY PRN
Status: DISCONTINUED | OUTPATIENT
Start: 2025-04-26 | End: 2025-04-28 | Stop reason: HOSPADM

## 2025-04-26 RX ORDER — PROCHLORPERAZINE EDISYLATE 5 MG/ML
10 INJECTION INTRAMUSCULAR; INTRAVENOUS ONCE
Status: COMPLETED | OUTPATIENT
Start: 2025-04-26 | End: 2025-04-26

## 2025-04-26 RX ORDER — LIDOCAINE HYDROCHLORIDE 10 MG/ML
30 INJECTION, SOLUTION EPIDURAL; INFILTRATION; INTRACAUDAL; PERINEURAL PRN
Status: DISCONTINUED | OUTPATIENT
Start: 2025-04-26 | End: 2025-04-26

## 2025-04-26 RX ORDER — LIDOCAINE HYDROCHLORIDE 10 MG/ML
INJECTION, SOLUTION EPIDURAL; INFILTRATION; INTRACAUDAL; PERINEURAL
Status: DISCONTINUED | OUTPATIENT
Start: 2025-04-26 | End: 2025-04-26

## 2025-04-26 RX ORDER — IBUPROFEN 600 MG/1
600 TABLET, FILM COATED ORAL EVERY 6 HOURS
Status: DISCONTINUED | OUTPATIENT
Start: 2025-04-26 | End: 2025-04-28 | Stop reason: HOSPADM

## 2025-04-26 RX ORDER — IBUPROFEN 600 MG/1
600 TABLET, FILM COATED ORAL 4 TIMES DAILY PRN
Qty: 30 TABLET | Refills: 1 | Status: SHIPPED | OUTPATIENT
Start: 2025-04-26

## 2025-04-26 RX ORDER — SODIUM CHLORIDE 0.9 % (FLUSH) 0.9 %
5-40 SYRINGE (ML) INJECTION PRN
Status: DISCONTINUED | OUTPATIENT
Start: 2025-04-26 | End: 2025-04-26

## 2025-04-26 RX ORDER — ONDANSETRON 2 MG/ML
4 INJECTION INTRAMUSCULAR; INTRAVENOUS EVERY 6 HOURS PRN
Status: DISCONTINUED | OUTPATIENT
Start: 2025-04-26 | End: 2025-04-26

## 2025-04-26 RX ORDER — DIPHENHYDRAMINE HYDROCHLORIDE 50 MG/ML
25 INJECTION, SOLUTION INTRAMUSCULAR; INTRAVENOUS EVERY 4 HOURS PRN
Status: DISCONTINUED | OUTPATIENT
Start: 2025-04-26 | End: 2025-04-26

## 2025-04-26 RX ORDER — SIMETHICONE 80 MG
80 TABLET,CHEWABLE ORAL EVERY 6 HOURS PRN
Status: DISCONTINUED | OUTPATIENT
Start: 2025-04-26 | End: 2025-04-26

## 2025-04-26 RX ORDER — SODIUM CHLORIDE 9 MG/ML
INJECTION, SOLUTION INTRAVENOUS PRN
Status: DISCONTINUED | OUTPATIENT
Start: 2025-04-26 | End: 2025-04-28 | Stop reason: HOSPADM

## 2025-04-26 RX ORDER — ONDANSETRON 2 MG/ML
4 INJECTION INTRAMUSCULAR; INTRAVENOUS EVERY 6 HOURS PRN
Status: DISCONTINUED | OUTPATIENT
Start: 2025-04-26 | End: 2025-04-28 | Stop reason: HOSPADM

## 2025-04-26 RX ORDER — SODIUM CHLORIDE, SODIUM LACTATE, POTASSIUM CHLORIDE, CALCIUM CHLORIDE 600; 310; 30; 20 MG/100ML; MG/100ML; MG/100ML; MG/100ML
INJECTION, SOLUTION INTRAVENOUS CONTINUOUS
Status: DISCONTINUED | OUTPATIENT
Start: 2025-04-26 | End: 2025-04-26

## 2025-04-26 RX ORDER — SODIUM CHLORIDE 0.9 % (FLUSH) 0.9 %
5-40 SYRINGE (ML) INJECTION EVERY 12 HOURS SCHEDULED
Status: DISCONTINUED | OUTPATIENT
Start: 2025-04-26 | End: 2025-04-26

## 2025-04-26 RX ORDER — SODIUM CHLORIDE 0.9 % (FLUSH) 0.9 %
5-40 SYRINGE (ML) INJECTION PRN
Status: DISCONTINUED | OUTPATIENT
Start: 2025-04-26 | End: 2025-04-28 | Stop reason: HOSPADM

## 2025-04-26 RX ORDER — SODIUM CHLORIDE 0.9 % (FLUSH) 0.9 %
5-40 SYRINGE (ML) INJECTION EVERY 12 HOURS SCHEDULED
Status: DISCONTINUED | OUTPATIENT
Start: 2025-04-26 | End: 2025-04-28 | Stop reason: HOSPADM

## 2025-04-26 RX ORDER — SIMETHICONE 80 MG
80 TABLET,CHEWABLE ORAL EVERY 6 HOURS PRN
Status: DISCONTINUED | OUTPATIENT
Start: 2025-04-26 | End: 2025-04-28 | Stop reason: HOSPADM

## 2025-04-26 RX ORDER — ONDANSETRON 4 MG/1
4 TABLET, ORALLY DISINTEGRATING ORAL EVERY 6 HOURS PRN
Status: DISCONTINUED | OUTPATIENT
Start: 2025-04-26 | End: 2025-04-28 | Stop reason: HOSPADM

## 2025-04-26 RX ORDER — SODIUM CHLORIDE 9 MG/ML
INJECTION, SOLUTION INTRAVENOUS PRN
Status: DISCONTINUED | OUTPATIENT
Start: 2025-04-26 | End: 2025-04-26

## 2025-04-26 RX ORDER — SENNA AND DOCUSATE SODIUM 50; 8.6 MG/1; MG/1
1 TABLET, FILM COATED ORAL DAILY
Status: DISCONTINUED | OUTPATIENT
Start: 2025-04-26 | End: 2025-04-26

## 2025-04-26 RX ORDER — MORPHINE SULFATE 2 MG/ML
2 INJECTION, SOLUTION INTRAMUSCULAR; INTRAVENOUS ONCE
Status: COMPLETED | OUTPATIENT
Start: 2025-04-26 | End: 2025-04-26

## 2025-04-26 RX ORDER — ACETAMINOPHEN 500 MG
1000 TABLET ORAL EVERY 6 HOURS PRN
Status: DISCONTINUED | OUTPATIENT
Start: 2025-04-26 | End: 2025-04-26

## 2025-04-26 RX ORDER — LIDOCAINE HYDROCHLORIDE AND EPINEPHRINE 15; 5 MG/ML; UG/ML
INJECTION, SOLUTION EPIDURAL
Status: DISCONTINUED | OUTPATIENT
Start: 2025-04-26 | End: 2025-04-26 | Stop reason: SDUPTHER

## 2025-04-26 RX ORDER — MORPHINE SULFATE 10 MG/ML
8 INJECTION INTRAVENOUS ONCE
Status: COMPLETED | OUTPATIENT
Start: 2025-04-26 | End: 2025-04-26

## 2025-04-26 RX ORDER — NALOXONE HYDROCHLORIDE 0.4 MG/ML
INJECTION, SOLUTION INTRAMUSCULAR; INTRAVENOUS; SUBCUTANEOUS PRN
Status: DISCONTINUED | OUTPATIENT
Start: 2025-04-26 | End: 2025-04-26

## 2025-04-26 RX ORDER — LANOLIN
CREAM (ML) TOPICAL PRN
Status: DISCONTINUED | OUTPATIENT
Start: 2025-04-26 | End: 2025-04-28 | Stop reason: HOSPADM

## 2025-04-26 RX ADMIN — Medication 166.7 ML: at 17:35

## 2025-04-26 RX ADMIN — SODIUM CHLORIDE, SODIUM LACTATE, POTASSIUM CHLORIDE, AND CALCIUM CHLORIDE: .6; .31; .03; .02 INJECTION, SOLUTION INTRAVENOUS at 07:14

## 2025-04-26 RX ADMIN — PROCHLORPERAZINE EDISYLATE 10 MG: 5 INJECTION INTRAMUSCULAR; INTRAVENOUS at 07:15

## 2025-04-26 RX ADMIN — SENNOSIDES AND DOCUSATE SODIUM 1 TABLET: 50; 8.6 TABLET ORAL at 21:50

## 2025-04-26 RX ADMIN — LIDOCAINE HYDROCHLORIDE,EPINEPHRINE BITARTRATE 3 ML: 15; .005 INJECTION, SOLUTION EPIDURAL; INFILTRATION; INTRACAUDAL; PERINEURAL at 08:07

## 2025-04-26 RX ADMIN — ROPIVACAINE HYDROCHLORIDE 5 ML: 2 INJECTION, SOLUTION EPIDURAL; INFILTRATION at 08:12

## 2025-04-26 RX ADMIN — MORPHINE SULFATE 2 MG: 2 INJECTION, SOLUTION INTRAMUSCULAR; INTRAVENOUS at 07:13

## 2025-04-26 RX ADMIN — MORPHINE SULFATE 8 MG: 10 INJECTION INTRAVENOUS at 07:15

## 2025-04-26 RX ADMIN — IBUPROFEN 600 MG: 600 TABLET, FILM COATED ORAL at 18:36

## 2025-04-26 RX ADMIN — BENZOCAINE AND LEVOMENTHOL: 200; 5 SPRAY TOPICAL at 21:52

## 2025-04-26 RX ADMIN — ROPIVACAINE HYDROCHLORIDE 12 ML/HR: 2 INJECTION, SOLUTION EPIDURAL; INFILTRATION at 13:48

## 2025-04-26 RX ADMIN — ROPIVACAINE HYDROCHLORIDE 10 ML/HR: 2 INJECTION, SOLUTION EPIDURAL; INFILTRATION at 08:14

## 2025-04-26 ASSESSMENT — PAIN DESCRIPTION - LOCATION: LOCATION: ABDOMEN

## 2025-04-26 ASSESSMENT — PAIN SCALES - GENERAL: PAINLEVEL_OUTOF10: 6

## 2025-04-26 NOTE — FLOWSHEET NOTE
0948,   TRAV Bustos, at bedside.  Epidural procedure explained, risks discussed.  Pt verbalizes consent for epidural.   0950patient positioned for epidural.0950  Time out completed.   0955 catheter placed. 0956 test dose given.  Epidural catheter taped and secured per anesthesia.   1000 to low fowlers with left uterine displacement. 1000 loading dose given. 1001 pump initiated.  Pt tolerated procedure well.

## 2025-04-26 NOTE — CARE COORDINATION
ANTEPARTUM NOTE    39 weeks gestation of pregnancy [Z3A.39]    Carmen was admitted to L&D on 4/26/2024 for spontaneous labor @ 39 0/7    OB GYN Provider: Dr Moon    Will meet with patient after delivery to verify name/address/phone/insurance and discuss discharge planning.     Anticipate DC home 2 nights after vaginal delivery or 4 nights after C/S delivery as long as hemodynamically stable.

## 2025-04-26 NOTE — FLOWSHEET NOTE
0730,   TRAV Gan, at bedside.  Epidural procedure explained, risks discussed.  Pt verbalizes consent for epidural.   0735 patient positioned for epidural.0736 Time out completed.   0806 catheter placed. 0807 test dose given.  Epidural catheter taped and secured per anesthesia.   0811 to low fowlers with left uterine displacement. 0812 loading dose given. 0816 pump initiated.  Pt tolerated procedure well.

## 2025-04-26 NOTE — ANESTHESIA PROCEDURE NOTES
Epidural Block    Patient location during procedure: OB  End time: 4/26/2025 8:07 AM  Reason for block: labor epidural  Staffing  Performed: resident/CRNA   Anesthesiologist: Shavonne Ortiz MD  Resident/CRNA: Elvia Kohli APRN - CRNA  Performed by: Elvia Kohli APRN - CRNA  Authorized by: Shavonne Ortiz MD    Epidural  Patient position: sitting  Prep: Betadine  Patient monitoring: continuous pulse ox and frequent blood pressure checks  Approach: midline  Location: L3-4  Injection technique: TOYIN air  Guidance: Doppler guided  Provider prep: mask and sterile gloves  Needle  Needle type: Tuohy   Needle gauge: 17 G  Needle insertion depth: 9 cm  Catheter type: side hole  Catheter size: 19 G  Catheter at skin depth: 15 cm  Test dose: negativeCatheter Secured: tegaderm and tape  Assessment  Sensory level: T4  Hemodynamics: stable  Attempts: 2  Outcomes: uncomplicated and patient tolerated procedure well  Preanesthetic Checklist  Completed: patient identified, IV checked, site marked, risks and benefits discussed, surgical/procedural consents, equipment checked, pre-op evaluation, timeout performed, anesthesia consent given, oxygen available, monitors applied/VS acknowledged, fire risk safety assessment completed and verbalized and blood product R/B/A discussed and consented

## 2025-04-26 NOTE — DISCHARGE SUMMARY
as: ADVIL;MOTRIN  Take 1 tablet by mouth 4 times daily as needed for Pain     senna-docusate 8.6-50 MG per tablet  Commonly known as: Senokot S  Take 1 tablet by mouth daily            CONTINUE taking these medications      PRENATAL VITAMIN PO               Where to Get Your Medications        These medications were sent to [id number] - [place], [place] - [address] - P [phone] - F [phone]  [address] [facility] [address]      Phone: [phone]   acetaminophen 500 MG tablet  ibuprofen 600 MG tablet  senna-docusate 8.6-50 MG per tablet       These medications were sent to [place], [place] - [address] [facility] -  [phone] - F [phone]  [facility] [facility] [address]      Phone: [phone]   ferrous sulfate 325 (65 Fe) MG tablet           Activity: pelvic rest x 6 weeks, no lifting greater than 15 lbs  Diet: regular diet  Follow up: 3 days for BP check    Condition on discharge: stable    Discharge date: 4/28/25    Nasrin Davis DO  Ob/Gyn Resident    Comments:  Home care and follow-up care were reviewed.  Pelvic rest, and birth control were reviewed. Signs and symptoms of mastitis and post partum depression were reviewed. The patient is to notify her physician if any of these occur. The patient was counseled on secondary smoke risks and the increased risk of sudden infant death syndrome and respiratory problems to her baby with exposure. She was counseled on various alternate recommendations to decrease the exposure to secondary smoke to her children.

## 2025-04-26 NOTE — H&P
OBSTETRICAL HISTORY AND PHYSICAL  [facility]    Date: 2025       Time: 4:54 AM   Patient Name: Carmen Brown     Patient : 1995  Room/Bed: 0705/0705-01    Admission Date/Time: 2025  4:21 AM      CC: Leakage of Fluid     HPI: Carmen Brown is a 29 y.o.  at 39w0d who presents to Labor and Delivery with leakage of fluid. Patient reports she woke up to a large gush of fluid around 0330. She has had trickling of fluid since that time.  Patient denies any fever, chills, N/V, headaches, vision changes, chest pain, shortness of breath, RUQ pain, abdominal pain, and increased swelling/tenderness in bilateral lower extremities. Patient denies any urinary complaints. The patient reports fetal movement is present, complains of contractions, complains of loss of fluid, denies vaginal bleeding.    DATING:  LMP: Patient's last menstrual period was 2024 (approximate).  Estimated Date of Delivery: 5/3/25   Based on: US at 15 2/7 weeks GA    PREGNANCY RISK FACTORS:  Patient Active Problem List   Diagnosis    Primigravida in second trimester    Low-lying placenta    39 weeks gestation of pregnancy        Steroids Given In This Pregnancy:  no     REVIEW OF SYSTEMS:   Constitutional: negative fever, negative chills, negative weight changes   HEENT: negative visual disturbances, negative headaches, negative dizziness, negative hearing loss  Breast: Negative breast abnormalities, negative breast lumps, negative nipple discharge  Respiratory: negative dyspnea, negative cough, negative SOB  Cardiovascular: negative chest pain,  negative palpitations, negative arrhythmia, negative syncope   Gastrointestinal: + abdominal pain (contractions), negative RUQ pain, negative N/V, negative diarrhea, negative constipation, negative bowel changes, negative heartburn   Genitourinary: negative dysuria, negative hematuria, negative urinary incontinence, + vaginal discharge, negative vaginal

## 2025-04-26 NOTE — ANESTHESIA PRE PROCEDURE
ROS              GI/Hepatic/Renal: Neg GI/Hepatic/Renal ROS            Endo/Other: Negative Endo/Other ROS                    Abdominal:             Vascular: negative vascular ROS.         Other Findings:             Anesthesia Plan      epidural     ASA 3     (Plt 234)        Anesthetic plan and risks discussed with patient.    Use of blood products discussed with patient whom consented to blood products.    Plan discussed with CRNA and attending.    Attending anesthesiologist reviewed and agrees with Preprocedure content                ABHISHEK Copeland - CRNA   4/26/2025

## 2025-04-26 NOTE — ANESTHESIA PROCEDURE NOTES
Epidural Block    Patient location during procedure: OB  Start time: 4/26/2025 9:55 AM  Reason for block: labor epidural  Staffing  Performed: resident/CRNA   Resident/CRNA: Castillo Barnes APRN - CRNA  Performed by: Castillo Barnes APRN - CRNA  Authorized by: Shavonne Ortiz MD    Epidural  Patient position: sitting  Prep: Betadine  Patient monitoring: continuous pulse ox and frequent blood pressure checks  Approach: midline  Location: L3-4  Injection technique: TOYIN air  Guidance: paresthesia technique  Provider prep: mask and sterile gloves  Needle  Needle type: Tuohy   Needle gauge: 17 G  Needle length: 3.5 in  Needle insertion depth: 9 cm  Catheter type: side hole  Catheter size: 20 G  Catheter at skin depth: 14 cm  Test dose: negativeCatheter Secured: tegaderm and tape  Assessment  Sensory level: T6  Hemodynamics: stable  Attempts: 1  Outcomes: uncomplicated and patient tolerated procedure well  Preanesthetic Checklist  Completed: patient identified, IV checked, site marked, risks and benefits discussed, surgical/procedural consents, equipment checked, pre-op evaluation, timeout performed, anesthesia consent given, oxygen available, monitors applied/VS acknowledged, fire risk safety assessment completed and verbalized and blood product R/B/A discussed and consented

## 2025-04-27 PROBLEM — Z3A.39 39 WEEKS GESTATION OF PREGNANCY: Status: RESOLVED | Noted: 2025-04-26 | Resolved: 2025-04-27

## 2025-04-27 PROBLEM — Z34.02 PRIMIGRAVIDA IN SECOND TRIMESTER: Status: RESOLVED | Noted: 2024-11-20 | Resolved: 2025-04-27

## 2025-04-27 LAB
ALBUMIN SERPL-MCNC: 2.9 G/DL (ref 3.5–5.2)
ALBUMIN/GLOB SERPL: 1.2 {RATIO} (ref 1–2.5)
ALP SERPL-CCNC: 127 U/L (ref 35–104)
ALT SERPL-CCNC: 18 U/L (ref 10–35)
ANION GAP SERPL CALCULATED.3IONS-SCNC: 9 MMOL/L (ref 9–16)
AST SERPL-CCNC: 27 U/L (ref 10–35)
BILIRUB SERPL-MCNC: 0.3 MG/DL (ref 0–1.2)
BUN SERPL-MCNC: 6 MG/DL (ref 6–20)
CALCIUM SERPL-MCNC: 8.5 MG/DL (ref 8.6–10.4)
CHLORIDE SERPL-SCNC: 109 MMOL/L (ref 98–107)
CO2 SERPL-SCNC: 20 MMOL/L (ref 20–31)
CREAT SERPL-MCNC: 0.5 MG/DL (ref 0.6–0.9)
CREAT UR-MCNC: 22.3 MG/DL (ref 28–217)
ERYTHROCYTE [DISTWIDTH] IN BLOOD BY AUTOMATED COUNT: 13.9 % (ref 11.8–14.4)
GFR, ESTIMATED: >90 ML/MIN/1.73M2
GLUCOSE SERPL-MCNC: 94 MG/DL (ref 74–99)
HCT VFR BLD AUTO: 29.2 % (ref 36.3–47.1)
HGB BLD-MCNC: 8.8 G/DL (ref 11.9–15.1)
MCH RBC QN AUTO: 25.3 PG (ref 25.2–33.5)
MCHC RBC AUTO-ENTMCNC: 30.1 G/DL (ref 28.4–34.8)
MCV RBC AUTO: 83.9 FL (ref 82.6–102.9)
NRBC BLD-RTO: 0 PER 100 WBC
PLATELET # BLD AUTO: 179 K/UL (ref 138–453)
PMV BLD AUTO: 12.4 FL (ref 8.1–13.5)
POTASSIUM SERPL-SCNC: 3.8 MMOL/L (ref 3.7–5.3)
PROT SERPL-MCNC: 5.3 G/DL (ref 6.6–8.7)
RBC # BLD AUTO: 3.48 M/UL (ref 3.95–5.11)
SODIUM SERPL-SCNC: 138 MMOL/L (ref 136–145)
TOTAL PROTEIN, URINE: 29 MG/DL
URINE TOTAL PROTEIN CREATININE RATIO: 1.3 (ref 0–0.2)
WBC OTHER # BLD: 13.7 K/UL (ref 3.5–11.3)

## 2025-04-27 PROCEDURE — 36415 COLL VENOUS BLD VENIPUNCTURE: CPT

## 2025-04-27 PROCEDURE — 85027 COMPLETE CBC AUTOMATED: CPT

## 2025-04-27 PROCEDURE — 6370000000 HC RX 637 (ALT 250 FOR IP)

## 2025-04-27 PROCEDURE — 80053 COMPREHEN METABOLIC PANEL: CPT

## 2025-04-27 PROCEDURE — 99024 POSTOP FOLLOW-UP VISIT: CPT | Performed by: OBSTETRICS & GYNECOLOGY

## 2025-04-27 PROCEDURE — 1220000000 HC SEMI PRIVATE OB R&B

## 2025-04-27 RX ORDER — FERROUS SULFATE 325(65) MG
325 TABLET ORAL DAILY
Qty: 60 TABLET | Refills: 1 | Status: SHIPPED | OUTPATIENT
Start: 2025-04-27

## 2025-04-27 RX ADMIN — IBUPROFEN 600 MG: 600 TABLET, FILM COATED ORAL at 13:33

## 2025-04-27 RX ADMIN — IBUPROFEN 600 MG: 600 TABLET, FILM COATED ORAL at 20:33

## 2025-04-27 RX ADMIN — IBUPROFEN 600 MG: 600 TABLET, FILM COATED ORAL at 03:42

## 2025-04-27 RX ADMIN — ACETAMINOPHEN 1000 MG: 500 TABLET ORAL at 08:30

## 2025-04-27 RX ADMIN — ACETAMINOPHEN 1000 MG: 500 TABLET ORAL at 15:22

## 2025-04-27 RX ADMIN — SENNOSIDES AND DOCUSATE SODIUM 1 TABLET: 50; 8.6 TABLET ORAL at 07:06

## 2025-04-27 RX ADMIN — SENNOSIDES AND DOCUSATE SODIUM 1 TABLET: 50; 8.6 TABLET ORAL at 20:33

## 2025-04-27 ASSESSMENT — PAIN DESCRIPTION - LOCATION
LOCATION: PERINEUM
LOCATION: ABDOMEN
LOCATION: PERINEUM
LOCATION: ABDOMEN

## 2025-04-27 ASSESSMENT — PAIN DESCRIPTION - DESCRIPTORS
DESCRIPTORS: DISCOMFORT
DESCRIPTORS: ACHING
DESCRIPTORS: CRAMPING
DESCRIPTORS: DISCOMFORT

## 2025-04-27 ASSESSMENT — PAIN DESCRIPTION - ORIENTATION
ORIENTATION: LOWER
ORIENTATION: MID;LOWER
ORIENTATION: MID;LOWER

## 2025-04-27 ASSESSMENT — PAIN SCALES - GENERAL
PAINLEVEL_OUTOF10: 2
PAINLEVEL_OUTOF10: 1
PAINLEVEL_OUTOF10: 2
PAINLEVEL_OUTOF10: 0

## 2025-04-27 NOTE — PLAN OF CARE
Problem: Vaginal Birth or  Section  Goal: Fetal and maternal status remain reassuring during the birth process  Description:  Birth OB-Pregnancy care plan goal which identifies if the fetal and maternal status remain reassuring during the birth process  2025 by Betsy Sotelo, RN  Outcome: Progressing  2025 by Giselle Kimball RN  Outcome: Progressing     Problem: Pain  Goal: Verbalizes/displays adequate comfort level or baseline comfort level  2025 by Betsy Sotelo, RN  Outcome: Progressing  2025 by Giselle Kimball RN  Outcome: Progressing

## 2025-04-27 NOTE — CARE COORDINATION
CASE MANAGEMENT POST-PARTUM TRANSITIONAL CARE PLAN    39 weeks gestation of pregnancy [Z3A.39]    OB Provider: Dr Red Leone met darleen/ Carmen at her bedside to discuss DCP. She is S/P  on 2025    [place] to [place]    Writer verified address, her phone number are correct on facesheet. Parent's phone numbers updated under the emergency contacts. She lives with her sister Nelly. Carmen denied barriers with transportation home, to doctor's appointments or with paying for medications upon discharge home.     [employer] insurance correct. Writer notified Carmen she has 30 days from date of birth to add  to insurance policy. She verbalized understanding.    Infant name on BC: Doni Pascualdamien Casiano.   Infant PCP Dr Vega.     DME: no  HOME CARE: no    Anticipated DC of mother and infant 1-2 days after .     BS RISK OF UNPLANNED READMISSION 2.0             1.7 Total Score

## 2025-04-27 NOTE — CONSULTS
INPATIENT CONSULT    Maternal /para status: primip     Current pregnancy:    Gestational age: 39 weeks     C/section or vaginal delivery:       Birth weight: 3645  8# 0.6oz    Plan for feeding: breast      Breast pump at home: has pump ordered      Assessment of breastfeeding:  Pt states baby feeding well at breast, denies pain or difficulty with latch.       Reviewed:   - Breastfeeding packet  - Expectations for normal  feeding   - Hand expression  - Deep latch/milk transfer  - Cues for feeding (early/late)     Encouraged:   - Frequent skin to skin with mom or dad  - Frequent attempts to feed  - Calling for assistance as needed

## 2025-04-28 VITALS
BODY MASS INDEX: 40.04 KG/M2 | TEMPERATURE: 97.7 F | SYSTOLIC BLOOD PRESSURE: 119 MMHG | HEART RATE: 76 BPM | WEIGHT: 226 LBS | DIASTOLIC BLOOD PRESSURE: 74 MMHG | OXYGEN SATURATION: 99 % | HEIGHT: 63 IN | RESPIRATION RATE: 16 BRPM

## 2025-04-28 PROCEDURE — 6370000000 HC RX 637 (ALT 250 FOR IP)

## 2025-04-28 RX ADMIN — ACETAMINOPHEN 1000 MG: 500 TABLET ORAL at 04:12

## 2025-04-28 RX ADMIN — IBUPROFEN 600 MG: 600 TABLET, FILM COATED ORAL at 07:58

## 2025-04-28 RX ADMIN — IBUPROFEN 600 MG: 600 TABLET, FILM COATED ORAL at 15:34

## 2025-04-28 RX ADMIN — SENNOSIDES AND DOCUSATE SODIUM 1 TABLET: 50; 8.6 TABLET ORAL at 07:59

## 2025-04-28 RX ADMIN — ACETAMINOPHEN 1000 MG: 500 TABLET ORAL at 11:52

## 2025-04-28 ASSESSMENT — PAIN SCALES - GENERAL
PAINLEVEL_OUTOF10: 1
PAINLEVEL_OUTOF10: 1
PAINLEVEL_OUTOF10: 2
PAINLEVEL_OUTOF10: 1

## 2025-04-28 ASSESSMENT — PAIN DESCRIPTION - DESCRIPTORS
DESCRIPTORS: CRAMPING
DESCRIPTORS: ACHING

## 2025-04-28 ASSESSMENT — PAIN DESCRIPTION - LOCATION
LOCATION: ABDOMEN;BACK
LOCATION: ABDOMEN

## 2025-04-28 NOTE — CARE COORDINATION
Discharge Report    [facility]  Clinical Case Management Department  Written by: SMILEY NÚÑEZ RN    Patient Name: Carmen Brown  Attending Provider: Shannan Dixon*  Admit Date: 2025  4:21 AM  MRN: [id number]  Account: [id number]                     : 1995  Discharge Date: 25      Disposition: home    SMILEY NÚÑEZ RN

## 2025-04-28 NOTE — ANESTHESIA POSTPROCEDURE EVALUATION
Department of Anesthesiology  Postprocedure Note    Patient: Carmen Brown  MRN: [id number]  YOB: 1995  Date of evaluation: 4/28/2025    Procedure Summary       Date: 04/26/25 Room / Location:     Anesthesia Start: 0741 Anesthesia Stop: 1730    Procedure: Labor Analgesia Diagnosis:     Scheduled Providers:  Responsible Provider: Shavonne Ortiz MD    Anesthesia Type: epidural ASA Status: 3            Anesthesia Type: No value filed.    Denise Phase I:      Denise Phase II:      Anesthesia Post Evaluation    Patient location during evaluation: bedside  Patient participation: complete - patient cannot participate  Level of consciousness: awake  Pain score: 1  Airway patency: patent  Nausea & Vomiting: no nausea and no vomiting  Cardiovascular status: blood pressure returned to baseline  Respiratory status: acceptable  Hydration status: euvolemic  Pain management: adequate        No notable events documented.

## 2025-04-28 NOTE — LACTATION NOTE
Mom reports difficulty getting baby latched on the breast. She reports having given the baby a bottle 20 minutes before. She noted onset of supply beginning. Reviewed comfort measures for engorgement. Mom to call out for LC assistance when baby alerts for a feeding.

## 2025-04-28 NOTE — DISCHARGE INSTRUCTIONS
Follow-up with your OB doctor in 3 days for blood pressure check or as specified by your physician.     Please refer to A NEW BEGINNING- YOUR PERSONAL GUIDE TO POSTPARTUM CARE book provided in your room. Please take this book home with you to refer to.    For Breastfeeding moms, you can contact our lactation specialist,  with any problems or questions you may have.  Phone number [phone]. Feel free to leave voice mail and your call will be returned as soon as possible.     DIET  Eat a well balanced diet focusing on foods high in fiber and protein.   Drink 8-10 glasses of fluids daily, especially water.  To avoid constipation you may take a mild stool softener as recommended by your doctor or midwife.    ACTIVITY  Gradually increase your activity.  Resume exercise regimen only after advise by your doctor or midwife.  Avoid lifting anything heavier than your baby or a gallon of milk for SIX weeks.   Avoid driving 1 week for vaginal delivery and 2 weeks for  section, unless otherwise instructed by physician.  Rise slowly from a lying to sitting and then a standing position.  Climb stairs carefully.  Use caution when carrying your baby up and down the stairs.  NO SEXUAL Activity for 6 weeks or until advised by your doctor; Nothing in vagina: intercourse, tampons, or douching.  Be prepared to discuss family planning at your follow-up OB visit.   You may feel tired or have a lack of energy.  You may continue your prenatal vitamin to replenish nutrients post delivery.  Nap when baby naps to catch up on sleep.   May shower, NO tub baths, swimming, or hot tubs.    EMOTIONS  You may feel alcocer, sad, teary, & overwhelmed for the first 2 weeks postpartum.  Contact your OB provider if you feel you may be showing signs of postpartum depression, or have thoughts of harming yourself or your infant.  If infant will not stop crying, contact another adult for help or place infant in their crib on their back and take a

## 2025-04-28 NOTE — PROGRESS NOTES
Anesthesia Epidural Rounding Progress Note    Carmen Brown   [id number]      Pt evaluated at bedside. See flowsheet for current vital signs. Pt states her pain is well  controlled.    There is no significant motor blockade.     Pt states she has not used the PCA button. The PCA button was within reach of the patient.. The pt was encouraged to use the PCA button as needed to supplement the continuous infusion.    All questions and concerns were addressed.     No adjustments needed to the epidural at this time.    ABHISHEK RUIZ - CRNA  4/26/25   11:47 AM EDT   
CLINICAL PHARMACY NOTE: MEDS TO BEDS    Total # of Prescriptions Filled: 1   The following medications were delivered to the patient:  Iron 325mg    Additional Documentation: delivered to patient in room 735 4/28 at 10:31am. Co-pay $0.33 [email or web address].  
Labor Progress Note    Carmen Brown is a 29 y.o. female  at 39w0d  The patient was seen and examined. Her pain is not well controlled. She has received her epidural but it is not providing her any pain relief. She reports fetal movement is present, complains of contractions, complains of loss of fluid, denies vaginal bleeding.       Vital Signs:  Vitals:    25 0827 25 0831 25 0846 25 0902   BP: 129/73 115/66 110/74 (!) 121/91   Pulse: 80 79 76 (!) 103   Resp: 17 16 16 16   Temp:       TempSrc:       SpO2:  98% 99%    Weight:       Height:             FHT: 130, moderate variability, accelerations present, decelerations absent  Contractions: regular but not tracing well during epidural    Chaperone for Intimate Exam: Chaperone was present for entire exam, Chaperone Name: MEENU Kincaid  Cervical Exam: 4 cm dilated, 90 effaced, -1 station  Pitocin: Not started yet    Membranes: SROM at 0330 clear fluid  Scalp Electrode in place: absent  Intrauterine Pressure Catheter in Place: absent    Interventions: SVE    Assessment/Plan:  Carmen Brown is a 29 y.o. female  at 39w0d admitted for Spontaneous labor with SROM (clear fluid) @ 0330   - GBS negative, No indication for GBS prophylaxis   - VSS, afebrile   -SVE: /-1   -cEFM/TOCO: category 1   -Epidural in place but not functioning well. Anesthesia contacted    -s/p morphine/compazine x1   -Continue to monitor   -Pitocin when contractions space out    Attending updated and in agreement with plan    Jimena Clements DO  Ob/Gyn Resident  2025, 9:16 AM    
Labor Progress Note    Carmen Brown is a 29 y.o. female  at 39w0d  The patient was seen and examined. Her pain is not well controlled. She reports fetal movement is present, complains of contractions, complains of loss of fluid, complains of vaginal bleeding.       Vital Signs:  Vitals:    25 0427 25 0600   BP:  127/73   Pulse:  70   Resp:  16   Temp:  98.4 °F (36.9 °C)   SpO2:  100%   Weight: 102.5 kg (226 lb)    Height: 1.6 m (5' 3\")        FHT:  135 , moderate variability, accelerations present, decelerations absent  Contractions: regular, every 2-4 minutes    Chaperone for Intimate Exam: Chaperone was present for entire exam, Chaperone Name: MEENU Lerner  Cervical Exam: 4 cm dilated, 90% effaced, 0 station  Pitocin: none> ordered    Membranes: Ruptured clear fluid  Scalp Electrode in place: absent  Intrauterine Pressure Catheter in Place: absent    Interventions: SVE    Assessment/Plan:  Carmen Brown is a 29 y.o. female  at 39w0d admitted for spontaneous labor w/ SROM    - GBS negative, No indication for GBS prophylaxis  - VSS, afebrile    - cEFM and TOCO: cat 1 w/ regular contractions   - SROM (clr) @ 0330   - SVE 4/90%/0   - Patient requesting pain medicaiton. Will order morphine/compazine x1   - Initiate pitocin per protocol   - Continue to monitor    Attending updated and in agreement with plan    Chantell Johns MD  Ob/Gyn Resident  2025, 7:09 AM    
Labor Progress Note    Carmen Brown is a 29 y.o. female  at 39w0d  The patient was seen and examined. Her pain is well controlled with an epidural. She reports fetal movement is present, complains of contractions, complains of loss of fluid, denies vaginal bleeding.       Vital Signs:  Vitals:    25 1205 25 1230 25 1300 25 1330   BP:  94/64 (!) 93/57 114/86   Pulse:   72 93   Resp:  16 16 16   Temp: 97.3 °F (36.3 °C)      TempSrc: Oral      SpO2:  100% 100% 100%   Weight:       Height:           FHT:  125 , moderate variability, accelerations present, decelerations absent  Contractions: regular, every 2-3 minutes    Chaperone for Intimate Exam: Chaperone was present for entire exam, Chaperone Name: Sanjiv CORRIGAN  Cervical Exam: 9 cm dilated, 90 effaced, 0 station  Pitocin: @ 0 mu/min    Membranes: Ruptured clear fluid  Scalp Electrode in place: absent  Intrauterine Pressure Catheter in Place: present    Interventions: SVE     Assessment/Plan:  Carmen Brown is a 29 y.o. female  at 39w0d admitted for Spontaneous labor with SROM    - GBS negative, No indication for GBS prophylaxis   - VSS, afebrile    - Cat 1 FHT and TOCO showing regular contractions    - Membranes SROM (clr) @ 0330   - SVE 9/90%/0   - cEFM and TOCO   - IUPC in place    - Epidural in place and functioning  - Continue to monitor closely     Attending updated and in agreement with plan    Annalise Burleson MD  Ob/Gyn Resident  2025, 1:45 PM   
Labor Progress Note    Carmen Brown is a 29 y.o. female  at 39w0d  The patient was seen and examined. Her pain is well controlled. She reports fetal movement is present, complains of contractions, complains of loss of fluid, denies vaginal bleeding.       Vital Signs:  Vitals:    25 1010 25 1021 25 1026 25 1032   BP: 113/63 (!) 118/51 117/61 111/65   Pulse: (!) 103 (!) 106 84 77   Resp: 16 17 16 16   Temp: 97.5 °F (36.4 °C)      TempSrc: Oral      SpO2: 99% 99% 100%    Weight:       Height:           FHT: 125, moderate variability, accelerations present, decelerations absent  Contractions: regular, every 2 minutes    Chaperone for Intimate Exam: Chaperone was present for entire exam, Chaperone Name: MEENU Kincaid  Cervical Exam: 4-5 cm dilated, 90 effaced, 0 station  Pitocin: Not started    Membranes: SROM clear fluid at 0330  Scalp Electrode in place: absent  Intrauterine Pressure Catheter in Place: IUPC placed    Interventions: SVE, IUPC    Assessment/Plan:  Carmen Brown is a 29 y.o. female  at 39w0d admitted for Spontaneous labor with SROM (clear fluid) @ 0330    - GBS negative, Pen G for GBS prophylaxis   - VSS, afebrile   -SVE: /   -cEFM/TOCO: category 1   -Epidural in place functioning well    -IUPC placed given periods of inability to trace contractions   -Pitocin per protocol   -Continue to monitor    Attending updated and in agreement with plan    Jimena Clements DO  Ob/Gyn Resident  2025, 11:15 AM    
Labor Progress Note    Carmen Brown is a 29 y.o. female  at 39w0d  The patient was seen and examined. Her pain is well controlled. She reports fetal movement is present, complains of contractions, complains of loss of fluid, denies vaginal bleeding.       Vital Signs:  Vitals:    25 1403 25 1433 25 1500 25 1510   BP: (!) 115/92 134/62 127/73    Pulse: 91 94 90    Resp: 16 17 18    Temp:    98.2 °F (36.8 °C)   TempSrc:    Oral   SpO2:   100%    Weight:       Height:           FHT: 125, moderate variability, accelerations present, decelerations absent  Contractions: regular, every 2 minutes    Chaperone for Intimate Exam: Chaperone was present for entire exam, Chaperone Name: MEENU Kincaid  Cervical Exam: anterior lip cm dilated, 90 effaced, 0 station  Pitocin: Not started    Membranes: SROM clear fluid at 0330  Scalp Electrode in place: absent  Intrauterine Pressure Catheter in Place: IUPC placed    Interventions: SVE    Assessment/Plan:  Carmen Brown is a 29 y.o. female  at 39w0d admitted for Spontaneous labor with SROM (clear fluid) @ 0330    - GBS negative, Pen G for GBS prophylaxis   - VSS, afebrile   -SVE: Anterior lip/90/0   -cEFM/TOCO: category 1   -Epidural in place functioning well    -IUPC in place   -Continue expectant management    Attending updated and in agreement with plan    Jimena Clements,   Ob/Gyn Resident  2025, 3:17 PM    
Obstetric/Gynecology Resident Interval Note    Repeat SVE performed. Unchanged at 9/90/0. Tracing remains category 1. Will reposition patient and perform SVE if she calls out.     Dr. Moon updated.     Jimena Clements DO  OB/GYN Resident, PGY2  [place], [place]  4/26/2025, 2:11 PM      
Obstetric/Gynecology Resident Interval Note    Tracing reviewed, Baseline 125 bpm, moderate variability, accelerations present, decelerations absent. Contractions q2 minutes with IUPC in place. Pitocin not started due to contraction frequency.     Tracing reassuring. Will perform SVE if patient calls out.     Jimena Clements, DO  OB/GYN Resident, PGY2  [place], [place]  4/26/2025, 1:32 PM      
POST PARTUM DAY # 1    Carmen Brown is a 29 y.o. female  This patient was seen & examined today. She is s/p  25    Her pregnancy was complicated by:   Patient Active Problem List   Diagnosis    Primigravida in second trimester    Low-lying placenta    39 weeks gestation of pregnancy    Normal labor and delivery     25 M Apg 8/9 Wt 8#0       Today she is doing well without any chief complaint. Her lochia is light. She denies chest pain, shortness of breath, headache, and lightheadedness. She is  breast feeding and she denies any breast tenderness. She is ambulating well. Her voiding pattern is normal. I reviewed signs and symptoms of post partum depression with the patient, she currently denies any of these symptoms. She is tolerating solids.     Vital Signs:  Vitals:    25 1830 25 1845 25 1900 25   BP: 133/68 (!) 126/96 (!) 117/47 133/70   Pulse: (!) 106 87 97 81   Resp:  16 17 16   Temp:    99.7 °F (37.6 °C)   TempSrc:    Oral   SpO2:    98%   Weight:       Height:             Physical Exam:  General:  no apparent distress, alert, and cooperative  Neurologic:  alert, oriented, normal speech, no focal findings or movement disorder noted  Lungs:  No increased work of breathing, good air exchange, no conversational dyspnea  Heart:  regular rate and rhythm    Abdomen: abdomen soft, non-distended, non-tender  Fundus: non-tender, normal size, firm, below umbilicus  Extremities:  no calf tenderness, non edematous      Lab:  Lab Results   Component Value Date    HGB 10.9 (L) 2025     Lab Results   Component Value Date    HCT 35.2 (L) 2025       Assessment/Plan:  Carmen Brown is a  PPD # 1 s/p    - Doing well, VSS   - male infant in General Care Nursery, circumcision desired   - Encourage ambulation   - Hgb pending this AM   - Motrin/Tylenol for pain  Rh positive/Rubella immune  -Rhogam not indicated  Breast feeding   -Counseled on s/sx of Mastitis  BMI 
POST PARTUM DAY # 2    Carmen Brown is a 29 y.o. female  This patient was seen & examined today. She is s/p  25    Her pregnancy was complicated by:   Patient Active Problem List   Diagnosis    Low-lying placenta     25 M Apg 8/9 Wt 8#0    PreE w/o SF    Postpartum state       Today she is doing well without any chief complaint. Her lochia is light. She denies chest pain, shortness of breath, headache, and lightheadedness. She is  breast feeding and she denies any breast tenderness. She is ambulating well. Her voiding pattern is normal. I reviewed signs and symptoms of post partum depression with the patient, she currently denies any of these symptoms. She is tolerating solids.     Vital Signs:  Vitals:    25 0828 25 1522 25 0411   BP: 113/67 (!) 129/55 119/72 118/71   Pulse: 75 82 92 79   Resp: 18 18 16 17   Temp: 98.1 °F (36.7 °C) 98.1 °F (36.7 °C) 97.9 °F (36.6 °C) 97.9 °F (36.6 °C)   TempSrc: Oral Oral Oral Oral   SpO2: 99%  99% 99%   Weight:       Height:             Physical Exam:  General:  no apparent distress, alert, and cooperative  Neurologic:  alert, oriented, normal speech, no focal findings or movement disorder noted  Lungs:  No increased work of breathing, good air exchange, no conversational dyspnea  Heart:  regular rate and rhythm    Abdomen: abdomen soft, non-distended, non-tender  Fundus: non-tender, normal size, firm, below umbilicus  Extremities:  no calf tenderness, non edematous      Lab:  Lab Results   Component Value Date    HGB 8.8 (L) 2025     Lab Results   Component Value Date    HCT 29.2 (L) 2025       Assessment/Plan:  Carmen Brown is a  PPD # 2 s/p    - Doing well, VSS   - Male infant in General Care Nursery, circumcision completed, awaiting reevaluation of circumcision prior to discharge of  due to moderate amount of bleeding following procedure    - Encourage ambulation   - Hgb PPD#1 8.8. Rx iron sent for 
Patient admitted to room 735 from L/D via wheelchair.   Oriented to room and surroundings.  Plan of care reviewed.  Verbalized understanding.  Instructed on infant security and safe sleep practices.   The following handouts given: A New Beginning: Your Guide to Postpartum Care, Rounding, [facility] Security System, [place] Meals, Babies Cry A lot, Safe Sleep, Security and Visitation Guidelines.   Call light placed within reach    
Final    MCV 04/26/2025 81.7 (L)  82.6 - 102.9 fL Final    MCH 04/26/2025 25.3  25.2 - 33.5 pg Final    MCHC 04/26/2025 31.0  28.4 - 34.8 g/dL Final    RDW 04/26/2025 13.9  11.8 - 14.4 % Final    Platelets 04/26/2025 234  138 - 453 k/uL Final    MPV 04/26/2025 12.3  8.1 - 13.5 fL Final    NRBC Automated 04/26/2025 0.0  0.0 per 100 WBC Final    Neutrophils % 04/26/2025 66 (H)  36 - 65 % Final    Lymphocytes % 04/26/2025 24  24 - 43 % Final    Monocytes % 04/26/2025 7  3 - 12 % Final    Eosinophils % 04/26/2025 1  1 - 4 % Final    Basophils % 04/26/2025 0  0 - 2 % Final    Immature Granulocytes % 04/26/2025 2 (H)  0 % Final    Neutrophils Absolute 04/26/2025 8.32 (H)  1.50 - 8.10 k/uL Final    Lymphocytes Absolute 04/26/2025 3.02  1.10 - 3.70 k/uL Final    Monocytes Absolute 04/26/2025 0.92  0.10 - 1.20 k/uL Final    Eosinophils Absolute 04/26/2025 0.18  0.00 - 0.44 k/uL Final    Basophils Absolute 04/26/2025 0.05  0.00 - 0.20 k/uL Final    Immature Granulocytes Absolute 04/26/2025 0.26  0.00 - 0.30 k/uL Final    RBC Morphology 04/26/2025 MICROCYTOSIS PRESENT   Final    Amphetamine Screen, Ur 04/26/2025 NEGATIVE  NEGATIVE Final    Cutoff: 1000 ng/mL    Barbiturate Screen, Ur 04/26/2025 NEGATIVE  NEGATIVE Final    Cutoff: 200 ng/ml    Benzodiazepine Screen, Urine 04/26/2025 NEGATIVE  NEGATIVE Final    Cutoff: 200 ng/ml    Cocaine Metabolite, Urine 04/26/2025 NEGATIVE  NEGATIVE Final    Cutoff: 300 ng/ml    Methadone Screen, Urine 04/26/2025 NEGATIVE  NEGATIVE Final    Cutoff: 300 ng/ml    Opiates, Urine 04/26/2025 NEGATIVE  NEGATIVE Final    Cutoff: 300 ng/ml    Phencyclidine, Urine 04/26/2025 NEGATIVE  NEGATIVE Final    Cutoff: 25 ng/ml    Cannabinoid Scrn, Ur 04/26/2025 NEGATIVE  NEGATIVE Final    Cutoff: 50 ng/ml    Oxycodone Screen, Ur 04/26/2025 NEGATIVE  NEGATIVE Final    Cutoff: 100 ng/ml    Fentanyl, Ur 04/26/2025 NEGATIVE  NEGATIVE Final    Cutoff: 5 ng/ml    Test Information 04/26/2025 Assay provides rapid

## 2025-04-28 NOTE — PLAN OF CARE
Problem: Vaginal Birth or  Section  Goal: Fetal and maternal status remain reassuring during the birth process  Description:  Birth OB-Pregnancy care plan goal which identifies if the fetal and maternal status remain reassuring during the birth process  Outcome: Completed     Problem: Pain  Goal: Verbalizes/displays adequate comfort level or baseline comfort level  Outcome: Completed

## 2025-04-29 LAB — SURGICAL PATHOLOGY REPORT: NORMAL

## 2025-05-09 ENCOUNTER — OFFICE VISIT (OUTPATIENT)
Dept: OBGYN CLINIC | Age: 30
End: 2025-05-09

## 2025-05-09 VITALS
SYSTOLIC BLOOD PRESSURE: 122 MMHG | WEIGHT: 208 LBS | HEART RATE: 82 BPM | RESPIRATION RATE: 18 BRPM | DIASTOLIC BLOOD PRESSURE: 74 MMHG | HEIGHT: 63 IN | BODY MASS INDEX: 36.86 KG/M2

## 2025-05-09 PROBLEM — O44.40 LOW-LYING PLACENTA: Status: RESOLVED | Noted: 2024-12-18 | Resolved: 2025-05-09

## 2025-05-09 NOTE — PROGRESS NOTES
Carmen Brown  2025  10:17 AM        Carmen Brown is a 29 y.o. female       The patient was seen. She has no chief complaints today. She delivered vaginally on 2025. She is not breast feeding and there is not any signs or symptoms of mastitis.  The patient completed the E.P.D.S. Evaluation form and scored 1.  She does not have any signs or symptoms of post partum depression. She denies any suicidal thoughts with a plan, intent to harm others, and delusional ideas.   Today her lochia is light she denies any dizziness or shortness of breath.      Her pregnancy was complicated by:  Patient Active Problem List    Diagnosis Date Noted    PreE w/o SF 2025    Postpartum state 2025     25 M Apg 8/9 Wt 8#0 2025         She does admit to having good home support.      OB History    Para Term  AB Living   1 1 1 0 0 1   SAB IAB Ectopic Molar Multiple Live Births   0 0 0 0 0 1      # Outcome Date GA Lbr Brody/2nd Weight Sex Type Anes PTL Lv   1 Term 25 39w0d 13:07 / 00:53 3.645 kg (8 lb 0.6 oz) M Vag-Spont EPI N KARYN      Name: Doni Casiano      Apgar1: 8  Apgar5: 9       Patient Active Problem List   Diagnosis     25 M Apg 8/9 Wt 8#0    PreE w/o SF    Postpartum state       Blood pressure 122/74, pulse 82, resp. rate 18, height 1.6 m (5' 3\"), weight 94.3 kg (208 lb), not currently breastfeeding.  Chaperone for Intimate Exam  Chaperone was offered and accepted as part of the rooming process.  Chaperone: NA        Abdomen: Soft and non-tender; good bowel sounds; no guarding, rebound or rigidity; no CVA tenderness bilaterally.    Extremities: No calf tenderness bilaterally. DTR 2/4 bilaterally. No edema.    Perineum: intact     Assessment:   Diagnosis Orders   1. Postpartum care and examination          Chief Complaint   Patient presents with    Postpartum Care     Patient Active Problem List    Diagnosis Date Noted    PreE w/o SF 2025

## 2025-06-06 ENCOUNTER — OFFICE VISIT (OUTPATIENT)
Dept: OBGYN CLINIC | Age: 30
End: 2025-06-06

## 2025-06-06 VITALS
BODY MASS INDEX: 37.56 KG/M2 | WEIGHT: 212 LBS | SYSTOLIC BLOOD PRESSURE: 120 MMHG | HEIGHT: 63 IN | DIASTOLIC BLOOD PRESSURE: 70 MMHG

## 2025-06-06 NOTE — PROGRESS NOTES
Carmen Brown is a 29 y.o. female    Delivery Information:  Delivery Date: 2025    Sex of Baby: male  Type of Delivery: Vaginal  Delivering Physician: dr vargas    Post Partum Questions:  No Do you Smoke?  If yes PPD is   Yes Do you intake caffeine?  No Do you use street drugs?  Yes Do you consume alcohol?  No Are breast feeding?  Yes Are you bottle feeding?  No Are you having any problems with your breasts? (lumps, discharge, asymmetry, or redness)  No Are you having any problems with bowel movements or urination?  No Are you having any vaginal discharge/itching/ or burning?  Yes Are you getting help and support with the baby?  No Have you had intercourse since your delivery?  No If you had intercourse did you use condoms?  Yes Have you had a menstrual cycle since delivery?  Date:   No Do you have any questions that need to be addressed today?    How long did you bleed after your delivery? 2 Weeks  What are your plans besides condoms for family planning? IUD  Who lives at home with you and your baby?  Fob and sister    The patient was counseled on the risks of tobacco abuse and the harm it may cause to the patient and her  baby as well as others in the household from secondary smoke exposure.  The patient was counseled on the risks of potential respiratory problems, cancers, and sudden infant death syndrome as well as other co-morbidities. These were discussed in detail. I reviewed cessation options and plans. The patient was counseled on smoking outdoors with appropriate covers of clothing and hair.  She was counseled on hand washing prior to holding or picking up the baby.    The patient had her questions answered in regards to the baby and was instructed to follow up with her pediatrician. She had reviewed with her safe sleep recommendations.    Vitals:    25 1007   BP: 120/70   BP Site: Right Upper Arm   Patient Position: Sitting   BP Cuff Size: Medium Adult   Weight: 96.2 kg (212 lb)

## 2025-07-11 ENCOUNTER — CLINICAL SUPPORT (OUTPATIENT)
Dept: OBGYN CLINIC | Age: 30
End: 2025-07-11
Payer: COMMERCIAL

## 2025-07-11 ENCOUNTER — HOSPITAL ENCOUNTER (OUTPATIENT)
Age: 30
Setting detail: SPECIMEN
Discharge: HOME | End: 2025-07-11

## 2025-07-11 DIAGNOSIS — N92.6 MISSED MENSES: Primary | ICD-10-CM

## 2025-07-11 DIAGNOSIS — N92.6 MISSED MENSES: ICD-10-CM

## 2025-07-11 LAB — B-HCG SERPL EIA 3RD IS-ACNC: <0.2 MIU/ML

## 2025-07-11 PROCEDURE — 36415 COLL VENOUS BLD VENIPUNCTURE: CPT | Performed by: NURSE PRACTITIONER

## 2025-07-11 NOTE — PROGRESS NOTES
Patient was in the office today for a hcg.    Draw per physician order using sterile technique.  Drawn from the right AC.

## 2025-07-14 ENCOUNTER — PROCEDURE VISIT (OUTPATIENT)
Dept: OBGYN CLINIC | Age: 30
End: 2025-07-14
Payer: COMMERCIAL

## 2025-07-14 VITALS
SYSTOLIC BLOOD PRESSURE: 118 MMHG | HEIGHT: 63 IN | WEIGHT: 226 LBS | DIASTOLIC BLOOD PRESSURE: 62 MMHG | BODY MASS INDEX: 40.04 KG/M2

## 2025-07-14 DIAGNOSIS — N94.6 DYSMENORRHEA: Primary | ICD-10-CM

## 2025-07-14 PROCEDURE — 58300 INSERT INTRAUTERINE DEVICE: CPT | Performed by: OBSTETRICS & GYNECOLOGY

## 2025-07-14 NOTE — PROGRESS NOTES
2025     25 M Apg 8/9 Wt 8#0 2025     Family Planning    reports that she has never smoked. She has never used smokeless tobacco.      PLAN:  Family Planning Counseling Completed  Barrier Recommendations  Removal of the Mirena IUD will be in 8 years on 2033   Annual health follow-up  reviewed  Return to office in 12 weeks for IUD string check  No tampons; edwin-pads only x 8 weeks reviewed  String checks reviewed      Orders Placed This Encounter   Procedures    INSERT INTRAUTERINE DEVICE    C.trachomatis N.gonorrhoeae DNA     Standing Status:   Future     Expected Date:   2025     Expiration Date:   2026    Vaginitis DNA Probe     Standing Status:   Future     Expected Date:   2025     Expiration Date:   2026

## 2025-07-15 ENCOUNTER — RESULTS FOLLOW-UP (OUTPATIENT)
Dept: OBGYN CLINIC | Age: 30
End: 2025-07-15

## 2025-07-15 RX ORDER — METRONIDAZOLE 500 MG/1
500 TABLET ORAL 2 TIMES DAILY
Qty: 14 TABLET | Refills: 0 | Status: SHIPPED | OUTPATIENT
Start: 2025-07-15 | End: 2025-07-22

## 2025-07-15 NOTE — TELEPHONE ENCOUNTER
[facility] Result Note Patient Contact Communication   [address] [place] [address]      07/15/25   4:49 PM     Patients Name: Carmen Brown   Medical Record Number: [id number]   Contact Serial Number: [id number]  YOB: 1995       Patients Phone Number: [phone]     Description of Recommendations:  The patient was contacted and her identity was confirmed with two of the specific identifiers listed above.  The information regarding her recent testing results and provider recommendations were reviewed with her in detail and all questions were answered.   Recent labs/Cultures/ Imaging Studies/Pathology reports and Urinalysis results are included:      Recommendations given by provider:  ----- Message from ABHISHEK Cleary CNP sent at 7/15/2025  2:43 PM EDT -----  +BV- flagyl 500mg PO BID x7 days       The patient verbalized understanding of the information above and the recommendation by the provider. She will contact her PCP if any other questions arise or contact our office for any other clarifications.        Electronically signed by Neom Edward on 7/15/2025 at 4:49 PM

## 2025-07-15 NOTE — TELEPHONE ENCOUNTER
----- Message from ABHISHEK Cleary - CNP sent at 7/15/2025  2:43 PM EDT -----  +BV- flagyl 500mg PO BID x7 days